# Patient Record
Sex: FEMALE | Race: WHITE
[De-identification: names, ages, dates, MRNs, and addresses within clinical notes are randomized per-mention and may not be internally consistent; named-entity substitution may affect disease eponyms.]

---

## 2018-02-26 ENCOUNTER — HOSPITAL ENCOUNTER (INPATIENT)
Dept: HOSPITAL 25 - ED | Age: 83
LOS: 7 days | Discharge: NURSING, REHAB OR LONG-TERM CARE | DRG: 884 | End: 2018-03-05
Attending: HOSPITALIST | Admitting: INTERNAL MEDICINE
Payer: MEDICARE

## 2018-02-26 DIAGNOSIS — R47.01: ICD-10-CM

## 2018-02-26 DIAGNOSIS — Z90.49: ICD-10-CM

## 2018-02-26 DIAGNOSIS — E53.8: ICD-10-CM

## 2018-02-26 DIAGNOSIS — F22: ICD-10-CM

## 2018-02-26 DIAGNOSIS — Z82.49: ICD-10-CM

## 2018-02-26 DIAGNOSIS — Z87.440: ICD-10-CM

## 2018-02-26 DIAGNOSIS — F03.90: Primary | ICD-10-CM

## 2018-02-26 DIAGNOSIS — Z87.891: ICD-10-CM

## 2018-02-26 DIAGNOSIS — Z85.3: ICD-10-CM

## 2018-02-26 DIAGNOSIS — F05: ICD-10-CM

## 2018-02-26 DIAGNOSIS — F32.9: ICD-10-CM

## 2018-02-26 DIAGNOSIS — R45.1: ICD-10-CM

## 2018-02-26 DIAGNOSIS — E05.90: ICD-10-CM

## 2018-02-26 LAB
BASOPHILS # BLD AUTO: 0 10^3/UL (ref 0–0.2)
BASOPHILS # BLD AUTO: 0.1 10^3/UL (ref 0–0.2)
EOSINOPHIL # BLD AUTO: 0.1 10^3/UL (ref 0–0.6)
EOSINOPHIL # BLD AUTO: 0.1 10^3/UL (ref 0–0.6)
HCT VFR BLD AUTO: 32 % (ref 35–47)
HCT VFR BLD AUTO: 32 % (ref 35–47)
HGB BLD-MCNC: 10.7 G/DL (ref 12–16)
HGB BLD-MCNC: 10.8 G/DL (ref 12–16)
LYMPHOCYTES # BLD AUTO: 3.1 10^3/UL (ref 1–4.8)
LYMPHOCYTES # BLD AUTO: 4 10^3/UL (ref 1–4.8)
MCH RBC QN AUTO: 32 PG (ref 27–31)
MCH RBC QN AUTO: 32 PG (ref 27–31)
MCHC RBC AUTO-ENTMCNC: 33 G/DL (ref 31–36)
MCHC RBC AUTO-ENTMCNC: 34 G/DL (ref 31–36)
MCV RBC AUTO: 96 FL (ref 80–97)
MCV RBC AUTO: 96 FL (ref 80–97)
MONOCYTES # BLD AUTO: 0.5 10^3/UL (ref 0–0.8)
MONOCYTES # BLD AUTO: 0.6 10^3/UL (ref 0–0.8)
NEUTROPHILS # BLD AUTO: 3.9 10^3/UL (ref 1.5–7.7)
NEUTROPHILS # BLD AUTO: 5 10^3/UL (ref 1.5–7.7)
NRBC # BLD AUTO: 0 10^3/UL
NRBC # BLD AUTO: 0 10^3/UL
NRBC BLD QL AUTO: 0
NRBC BLD QL AUTO: 0.1
PLATELET # BLD AUTO: 163 10^3/UL (ref 150–450)
PLATELET # BLD AUTO: 169 10^3/UL (ref 150–450)
RBC # BLD AUTO: 3.33 10^6/UL (ref 4–5.4)
RBC # BLD AUTO: 3.34 10^6/UL (ref 4–5.4)
WBC # BLD AUTO: 8.6 10^3/UL (ref 3.5–10.8)
WBC # BLD AUTO: 8.9 10^3/UL (ref 3.5–10.8)

## 2018-02-26 PROCEDURE — 36415 COLL VENOUS BLD VENIPUNCTURE: CPT

## 2018-02-26 PROCEDURE — 84520 ASSAY OF UREA NITROGEN: CPT

## 2018-02-26 PROCEDURE — 80053 COMPREHEN METABOLIC PANEL: CPT

## 2018-02-26 PROCEDURE — G0378 HOSPITAL OBSERVATION PER HR: HCPCS

## 2018-02-26 PROCEDURE — 94760 N-INVAS EAR/PLS OXIMETRY 1: CPT

## 2018-02-26 PROCEDURE — 70450 CT HEAD/BRAIN W/O DYE: CPT

## 2018-02-26 PROCEDURE — 93005 ELECTROCARDIOGRAM TRACING: CPT

## 2018-02-26 PROCEDURE — 84436 ASSAY OF TOTAL THYROXINE: CPT

## 2018-02-26 PROCEDURE — 84479 ASSAY OF THYROID (T3 OR T4): CPT

## 2018-02-26 PROCEDURE — 82607 VITAMIN B-12: CPT

## 2018-02-26 PROCEDURE — 85025 COMPLETE CBC W/AUTO DIFF WBC: CPT

## 2018-02-26 PROCEDURE — 84439 ASSAY OF FREE THYROXINE: CPT

## 2018-02-26 PROCEDURE — 83605 ASSAY OF LACTIC ACID: CPT

## 2018-02-26 PROCEDURE — 84481 FREE ASSAY (FT-3): CPT

## 2018-02-26 PROCEDURE — 99285 EMERGENCY DEPT VISIT HI MDM: CPT

## 2018-02-26 PROCEDURE — 84443 ASSAY THYROID STIM HORMONE: CPT

## 2018-02-26 PROCEDURE — 82306 VITAMIN D 25 HYDROXY: CPT

## 2018-02-26 PROCEDURE — 81003 URINALYSIS AUTO W/O SCOPE: CPT

## 2018-02-26 PROCEDURE — 82140 ASSAY OF AMMONIA: CPT

## 2018-02-26 PROCEDURE — 80048 BASIC METABOLIC PNL TOTAL CA: CPT

## 2018-02-26 PROCEDURE — 71045 X-RAY EXAM CHEST 1 VIEW: CPT

## 2018-02-26 PROCEDURE — 84484 ASSAY OF TROPONIN QUANT: CPT

## 2018-02-26 PROCEDURE — 70551 MRI BRAIN STEM W/O DYE: CPT

## 2018-02-26 RX ADMIN — HEPARIN SODIUM SCH UNITS: 5000 INJECTION INTRAVENOUS; SUBCUTANEOUS at 22:47

## 2018-02-26 NOTE — ED
Alberto KEYES Gabriel, scribed for Kali Michelle MD on 02/26/18 at 1445 .





GI/ HPI





- HPI Summary


HPI Summary: 





This patient is a 85 year old F BIBA to [facility] with a chief complaint of a 

possible UTI. Pt lives at home with her son and the visiting nurse reports that 

the patients urine was foul and dark. Pt seems to agitated and  disowned her 

son in the ED. She seems confused and goes from being somewhat cooperative to 

not cooperative when asked questions about her condition.  Pt asked the doctor 

to leave the room claiming he was not a real doctor and then became tearful. Hx 

of UTI and abx noncompliance. 


LEVEL 5 CAVEAT: HPI limited due to the patient being uncooperative. 





- History of Current Complaint


Time Seen by Provider: 02/26/18 14:38


Stated Complaint: POSSIBLE UTI


Hx Obtained From: Patient, Family/Caretaker, EMS, Medical Records


Hx From Patient Unobtainable Due To: Altered Mental Status


Onset/Duration: Still Present


Timing: Constant


Severity: Moderate


Current Severity: Moderate


Pain Intensity: 0


Associated Signs and Symptoms: Positive: Negative - fever





- Allergy/Home Medications


Allergies/Adverse Reactions: 


 Allergies











Allergy/AdvReac Type Severity Reaction Status Date / Time


 


No Known Allergies Allergy   Verified 02/26/18 15:26











Home Medications: 


 Home Medications





Cholecalciferol TAB* [Vitamin D TAB*] 1,000 unit PO DAILY 02/26/18 [History 

Confirmed 02/26/18]


Cyanocobalamin TAB* [Vitamin B12 TAB*] 500 mcg PO QAM 02/26/18 [History 

Confirmed 02/26/18]


Escitalopram (NF) [Lexapro 5 mg (NF)] 5 mg PO QAM 02/26/18 [History Confirmed 02 /26/18]


Levothyroxine TAB* [Synthroid TAB*] 100 mcg PO QAM 02/26/18 [History Confirmed 

02/26/18]


Mirtazapine TAB* [Remeron TAB*] 15 mg PO QPM 02/26/18 [History Confirmed 02/26/ 18]


Rivastigmine PATCH 4.6 MG(NF) [Exelon(NF)] 1 patch TRANSDERM DAILY 02/26/18 [

History Confirmed 02/26/18]


Sulfamethox/Trimethoprim DS* [Bactrim /160 TAB*] 1 tab PO BID 02/26/18 [

History Confirmed 02/26/18]











PMH/Surg Hx/FS Hx/Imm Hx


 History: Reports: Other  Problems/Disorders - UTI 


Infectious Disease History: No


Infectious Disease History: 


   Denies: Traveled Outside the [place] in Last 30 Days





- Social History


Lives: With Family





- Additional Comments


History Additional Comments: 





LEVEL 5 CAVEAT: PMHx limited due to the patient being uncooperative. 





Review of Systems





- ROS Summary


Review of Systems Summary: 





LEVEL 5 CAVEAT: ROS limited due to the patient being uncooperative. 


Negative: Fever


Negative: Slurred Speech


All Other Systems Reviewed And Are Negative: No





Physical Exam





- Summary


Physical Exam Summary: 





LEVEL 5 CAVEAT: PE limited due to the patient being uncooperative. Pt would not 

let any examination take place. 





Appearance: The patient is an elderly female.





Skin:  skin color reflects adequate perfusion.


 


HEENT:  The head is normocephalic and atraumatic.  The conjunctivae are clear 

and without drainage.  Nares are patent and without drainage.  Mouth reveals 

moist mucous membranes. The external ears are intact.





 


Neurological: Patient is alert and oriented.  The patient has symmetrical motor 

strength in all four extremities.  Cranial nerves are grossly intact. 


 


Psychiatric: The patient is confused and tearful 


 





Triage Information Reviewed: Yes


Vital Signs On Initial Exam: 


 Initial Vitals











Temp Pulse Resp BP Pulse Ox


 


 97.1 F   69   15   139/93   95 


 


 02/26/18 14:30  02/26/18 14:30  02/26/18 14:30  02/26/18 14:30  02/26/18 14:30











Vital Signs Reviewed: Yes


Completion Of Physical Exam Limited Due To: Level 5





Diagnostics





- Vital Signs


 Vital Signs











  Temp Pulse Resp BP Pulse Ox


 


 02/26/18 14:30  97.1 F  69  15  139/93  95














- Laboratory


Lab Results: 


 Lab Results











  02/26/18 02/26/18 02/26/18 Range/Units





  15:33 15:59 15:59 


 


WBC   8.9   (3.5-10.8)  10^3/ul


 


RBC   3.33 L   (4.0-5.4)  10^6/ul


 


Hgb   10.8 L   (12.0-16.0)  g/dl


 


Hct   32 L   (35-47)  %


 


MCV   96   (80-97)  fL


 


MCH   32 H   (27-31)  pg


 


MCHC   34   (31-36)  g/dl


 


RDW   16 H   (10.5-15)  %


 


Plt Count   163   (150-450)  10^3/ul


 


MPV   8   (7.4-10.4)  um3


 


Neut % (Auto)   56.1   (38-83)  %


 


Lymph % (Auto)   35.0   (25-47)  %


 


Mono % (Auto)   7.1 H   (0-7)  %


 


Eos % (Auto)   1.3   (0-6)  %


 


Baso % (Auto)   0.5   (0-2)  %


 


Absolute Neuts (auto)   5.0   (1.5-7.7)  10^3/ul


 


Absolute Lymphs (auto)   3.1   (1.0-4.8)  10^3/ul


 


Absolute Monos (auto)   0.6   (0-0.8)  10^3/ul


 


Absolute Eos (auto)   0.1   (0-0.6)  10^3/ul


 


Absolute Basos (auto)   0   (0-0.2)  10^3/ul


 


Absolute Nucleated RBC   0   10^3/ul


 


Nucleated RBC %   0.1   


 


Sodium    141  (133-145)  mmol/L


 


Potassium    4.3  (3.5-5.0)  mmol/L


 


Chloride    109  (101-111)  mmol/L


 


Carbon Dioxide    28  (22-32)  mmol/L


 


Anion Gap    4  (2-11)  mmol/L


 


BUN    17  (6-24)  mg/dL


 


Creatinine    0.90  (0.51-0.95)  mg/dL


 


Est GFR ( Amer)    76.5  (>60)  


 


Est GFR (Non-Af Amer)    59.5  (>60)  


 


BUN/Creatinine Ratio    18.9  (8-20)  


 


Glucose    115 H  ()  mg/dL


 


Lactic Acid     (0.5-2.0)  mmol/L


 


Calcium    9.2  (8.6-10.3)  mg/dL


 


Total Bilirubin    0.30  (0.2-1.0)  mg/dL


 


AST    15  (13-39)  U/L


 


ALT    5 L  (7-52)  U/L


 


Alkaline Phosphatase    43  ()  U/L


 


Ammonia     (16-53)  mol/L


 


Troponin I    0.00  (<0.04)  ng/mL


 


Total Protein    6.6  (6.4-8.9)  g/dL


 


Albumin    3.4  (3.2-5.2)  g/dL


 


Globulin    3.2  (2-4)  g/dL


 


Albumin/Globulin Ratio    1.1  (1-3)  


 


Vitamin B12    457  (180-914)  pg/mL


 


TSH    0.03 L  (0.34-5.60)  mcIU/mL


 


Free T4    1.41 H  (0.61-1.12)  ng/dL


 


Thyroxine (T4)    9.68  (6.09-12.23)  mcg/mL


 


Free T3    2.80  (2.5-3.9)  pg/mL


 


Total T3    0.71 L  (0.87-1.78)  ng/mL


 


Urine Color  Yellow    


 


Urine Appearance  Cloudy    


 


Urine pH  7.0    (5-9)  


 


Ur Specific Gravity  1.006 L    (1.010-1.030)  


 


Urine Protein  Negative    (Negative)  


 


Urine Ketones  Negative    (Negative)  


 


Urine Blood  Negative    (Negative)  


 


Urine Nitrate  Negative    (Negative)  


 


Urine Bilirubin  Negative    (Negative)  


 


Urine Urobilinogen  Negative    (Negative)  


 


Ur Leukocyte Esterase  Negative    (Negative)  


 


Urine Glucose  Negative    (Negative)  














  02/26/18 02/26/18 02/26/18 Range/Units





  15:59 19:37 19:37 


 


WBC    8.6  (3.5-10.8)  10^3/ul


 


RBC    3.34 L  (4.0-5.4)  10^6/ul


 


Hgb    10.7 L  (12.0-16.0)  g/dl


 


Hct    32 L  (35-47)  %


 


MCV    96  (80-97)  fL


 


MCH    32 H  (27-31)  pg


 


MCHC    33  (31-36)  g/dl


 


RDW    15  (10.5-15)  %


 


Plt Count    169  (150-450)  10^3/ul


 


MPV    8  (7.4-10.4)  um3


 


Neut % (Auto)    45.7  (38-83)  %


 


Lymph % (Auto)    46.3  (25-47)  %


 


Mono % (Auto)    6.1  (0-7)  %


 


Eos % (Auto)    1.2  (0-6)  %


 


Baso % (Auto)    0.7  (0-2)  %


 


Absolute Neuts (auto)    3.9  (1.5-7.7)  10^3/ul


 


Absolute Lymphs (auto)    4.0  (1.0-4.8)  10^3/ul


 


Absolute Monos (auto)    0.5  (0-0.8)  10^3/ul


 


Absolute Eos (auto)    0.1  (0-0.6)  10^3/ul


 


Absolute Basos (auto)    0.1  (0-0.2)  10^3/ul


 


Absolute Nucleated RBC    0  10^3/ul


 


Nucleated RBC %    0  


 


Sodium     (133-145)  mmol/L


 


Potassium     (3.5-5.0)  mmol/L


 


Chloride     (101-111)  mmol/L


 


Carbon Dioxide     (22-32)  mmol/L


 


Anion Gap     (2-11)  mmol/L


 


BUN   17   (6-24)  mg/dL


 


Creatinine     (0.51-0.95)  mg/dL


 


Est GFR ( Amer)     (>60)  


 


Est GFR (Non-Af Amer)     (>60)  


 


BUN/Creatinine Ratio     (8-20)  


 


Glucose     ()  mg/dL


 


Lactic Acid  1.7    (0.5-2.0)  mmol/L


 


Calcium     (8.6-10.3)  mg/dL


 


Total Bilirubin     (0.2-1.0)  mg/dL


 


AST     (13-39)  U/L


 


ALT     (7-52)  U/L


 


Alkaline Phosphatase     ()  U/L


 


Ammonia     (16-53)  mol/L


 


Troponin I     (<0.04)  ng/mL


 


Total Protein     (6.4-8.9)  g/dL


 


Albumin     (3.2-5.2)  g/dL


 


Globulin     (2-4)  g/dL


 


Albumin/Globulin Ratio     (1-3)  


 


Vitamin B12     (180-914)  pg/mL


 


TSH     (0.34-5.60)  mcIU/mL


 


Free T4     (0.61-1.12)  ng/dL


 


Thyroxine (T4)     (6.09-12.23)  mcg/mL


 


Free T3     (2.5-3.9)  pg/mL


 


Total T3     (0.87-1.78)  ng/mL


 


Urine Color     


 


Urine Appearance     


 


Urine pH     (5-9)  


 


Ur Specific Gravity     (1.010-1.030)  


 


Urine Protein     (Negative)  


 


Urine Ketones     (Negative)  


 


Urine Blood     (Negative)  


 


Urine Nitrate     (Negative)  


 


Urine Bilirubin     (Negative)  


 


Urine Urobilinogen     (Negative)  


 


Ur Leukocyte Esterase     (Negative)  


 


Urine Glucose     (Negative)  














  02/26/18 Range/Units





  19:37 


 


WBC   (3.5-10.8)  10^3/ul


 


RBC   (4.0-5.4)  10^6/ul


 


Hgb   (12.0-16.0)  g/dl


 


Hct   (35-47)  %


 


MCV   (80-97)  fL


 


MCH   (27-31)  pg


 


MCHC   (31-36)  g/dl


 


RDW   (10.5-15)  %


 


Plt Count   (150-450)  10^3/ul


 


MPV   (7.4-10.4)  um3


 


Neut % (Auto)   (38-83)  %


 


Lymph % (Auto)   (25-47)  %


 


Mono % (Auto)   (0-7)  %


 


Eos % (Auto)   (0-6)  %


 


Baso % (Auto)   (0-2)  %


 


Absolute Neuts (auto)   (1.5-7.7)  10^3/ul


 


Absolute Lymphs (auto)   (1.0-4.8)  10^3/ul


 


Absolute Monos (auto)   (0-0.8)  10^3/ul


 


Absolute Eos (auto)   (0-0.6)  10^3/ul


 


Absolute Basos (auto)   (0-0.2)  10^3/ul


 


Absolute Nucleated RBC   10^3/ul


 


Nucleated RBC %   


 


Sodium   (133-145)  mmol/L


 


Potassium   (3.5-5.0)  mmol/L


 


Chloride   (101-111)  mmol/L


 


Carbon Dioxide   (22-32)  mmol/L


 


Anion Gap   (2-11)  mmol/L


 


BUN   (6-24)  mg/dL


 


Creatinine   (0.51-0.95)  mg/dL


 


Est GFR ( Amer)   (>60)  


 


Est GFR (Non-Af Amer)   (>60)  


 


BUN/Creatinine Ratio   (8-20)  


 


Glucose   ()  mg/dL


 


Lactic Acid   (0.5-2.0)  mmol/L


 


Calcium   (8.6-10.3)  mg/dL


 


Total Bilirubin   (0.2-1.0)  mg/dL


 


AST   (13-39)  U/L


 


ALT   (7-52)  U/L


 


Alkaline Phosphatase   ()  U/L


 


Ammonia  38  (16-53)  mol/L


 


Troponin I   (<0.04)  ng/mL


 


Total Protein   (6.4-8.9)  g/dL


 


Albumin   (3.2-5.2)  g/dL


 


Globulin   (2-4)  g/dL


 


Albumin/Globulin Ratio   (1-3)  


 


Vitamin B12   (180-914)  pg/mL


 


TSH   (0.34-5.60)  mcIU/mL


 


Free T4   (0.61-1.12)  ng/dL


 


Thyroxine (T4)   (6.09-12.23)  mcg/mL


 


Free T3   (2.5-3.9)  pg/mL


 


Total T3   (0.87-1.78)  ng/mL


 


Urine Color   


 


Urine Appearance   


 


Urine pH   (5-9)  


 


Ur Specific Gravity   (1.010-1.030)  


 


Urine Protein   (Negative)  


 


Urine Ketones   (Negative)  


 


Urine Blood   (Negative)  


 


Urine Nitrate   (Negative)  


 


Urine Bilirubin   (Negative)  


 


Urine Urobilinogen   (Negative)  


 


Ur Leukocyte Esterase   (Negative)  


 


Urine Glucose   (Negative)  











Result Diagrams: 


 02/26/18 19:37





 02/26/18 19:37


Lab Statement: Any lab studies that have been ordered have been reviewed, and 

results considered in the medical decision making process.





- CT


  ** CT Head


CT Interpretation Completed By: Radiologist - 1. No acute intracranial process 

evident. 2. Involutional change and stigmata of chronic small vessel ischemic 

disease. ED physician has reviewed this radiology report.





- EKG


  ** 1723


Cardiac Rate: NL


EKG Rhythm: Sinus Rhythm - at 74 BPM


EKG Interpretation: baseline wander in inferior leads 





GIGU Course/Dx





- Course


Course Of Treatment: Ms. Stokes presents with confusion and agitation.   

According to the son this is new and, therefore, represents an acute delirium.  

I could not find the source and have asked the hospitalist service to evaluate 

her.





- Diagnoses


Provider Diagnoses: 


 Acute delirium








- Physician Notifications


Discussed Care Of Patient With: Fred Frankenberg


Time Discussed With Above Provider: 20:27


Instructed by Provider To: Admit As Inpatient





Discharge





- Discharge Plan


Condition: Fair


Disposition: ADMITTED TO [facility]





The documentation as recorded by the Alberto aviles Gabriel accurately reflects 

the service I personally performed and the decisions made by me, Kali Michelle MD.

## 2018-02-26 NOTE — RAD
Indication: Altered mental status.



Comparison: No prior exams available on the [facility] PACS for comparison.



Technique: Noncontrast CT vertex of skull through foramen magnum.



Report: Moderately severe prominence of the cerebral sulci and moderate prominence of the

cerebellar fissures reflecting atrophy. Unremarkable ventricles and basal cisterns.

Decreased density in the periventricular and subcortical white matter while non-specific

is most likely due to chronic microangiopathy. Negative for gray matter white matter

obscuration, intra or extra-axial hemorrhage, or mass effect. Unremarkable partially

visualized orbital contents.



No fracture or suspicious calvarial or skull base lesion evident. Indolent thickening of

the inner table of the frontal bone noted. Clear visualized paranasal sinuses and mastoid

air spaces. Unremarkable scalp.



IMPRESSION: 

1. No acute intracranial process evident.

2. Involutional change and stigmata of chronic small vessel ischemic disease.

## 2018-02-26 NOTE — HP
CC:  Dr. Sims; Dr. Antonio *

 

HISTORY AND PHYSICAL:

 

DATE OF ADMISSION:  18

 

PRIMARY CARE PROVIDER:  Dr. Sims.

 

ATTENDING PHYSICIAN WHILE IN THE HOSPITAL:  Fred Frankenberg, MD * (report 
dictated by Donald Leong NP).

 

CHIEF COMPLAINT:  Altered mental status.

 

HISTORY OF PRESENT ILLNESS:  I would like to preface the report by saying that 
the patient has a moderate amount of underlying dementia.  She really does not 
know why she is here today.  She does not realize that she, according to the son
, had been having hallucinations and was paranoid and not acting at her 
baseline.  The family says that over the last couple of days, there has been 
issues with the patient being aggressive towards her son particularly at night.
  They do note that she gets more confused at night.  There has been no reports 
of fevers or chills.  There was concern for possible UTI so that is why they 
came into the hospital.  She was evaluated.  When I am asking her if she is 
having any complaints, she denies chest pain, denies having any cough, fevers, 
no vomiting or diarrhea.  Family has not noticed any of these symptoms.  They 
do note that she has been cheeking some of her pills at times.  She has not 
been swallowing her hypothyroid medications and has not taking her B12 
medication.  There was concern though because she was a little more confused 
than her baseline, she was more paranoid, and the family brought her into the 
hospital today to be evaluated.

 

PAST MEDICAL HISTORY:  Significant for:

1.  Hypothyroidism.

2.  Dementia.

3.  Breast cancer.

 

PAST SURGICAL HISTORY:  She had a laparoscopic cholecystectomy.

 

HOME MEDICATIONS:  Include:

1.  Bactrim 1 tablet p.o. b.i.d.

2.  B12 500 mcg daily.

3.  Vitamin D 1000 units daily.

4.  Exelon 1 patch transdermally daily.

5.  Remeron 15 mg p.o. q.p.m.

6.  Synthroid 100 mcg p.o. daily.

7.  Lexapro 5 mg p.o. daily.

 

ALLERGIES TO MEDICATIONS:  Include no known drug allergies.

 

FAMILY HISTORY:  Her father  of MI.  She thinks her mother is still alive 
in [place], but is not the case.

 

SOCIAL HISTORY:  She is a former smoker.  She lives with her son, Linda now.

 

REVIEW OF SYSTEMS:  There is no documented fever.  Denied having any 
significant weight change.  There was no double vision.  There is no ear 
discharge.  Denied having any rhinorrhea.  There is no sore throat.  No thyroid 
enlargement.  Denied having any chest pain.  There is orthopnea.  There is no 
nocturnal dyspnea.  There was no abdominal pain.  There is no nausea, no 
vomiting, no dysuria, no frequency, no seizure, no loss of consciousness, no 
pruritus, and no skin ulcerations.  Review of 14 systems completed, all others 
negative.

 

                               PHYSICAL EXAMINATION

 

GENERAL:  Ms. Stokes is an 85-year-old female patient.  She is sitting in the ED 
stretcher.  She does not appear to be in any acute distress.

 

VITAL SIGNS:  Blood pressure 153/75, pulse 67, respirations 16, O2 saturation 95
%, temperature 97.1.

 

HEENT:  Head, atraumatic, normocephalic.  Eyes:  EOMs intact.  Sclerae 
anicteric and not pale.  Throat:  Oral mucosa appears to be moist.  No 
oropharyngeal erythema.

 

NECK:  Supple.

 

LUNGS:  Clear to auscultation bilaterally.  No wheezes, rales or rhonchi.

 

HEART:  Sounds S1, S2.  Regular rate and rhythm.  No murmurs, rubs or gallops.

 

ABDOMEN:  Soft, flat, nontender.  Bowel sounds present.

 

EXTREMITIES:  Pulses were 2+ throughout.  She had no peripheral edema noted.  
She is moving all 4 extremities with 5/5 strength.

 

NEUROLOGIC:  She is awake to herself only.  She is confused to time and place. 
Speech is clear.  Tongue is midline.  No gross focal deficits.

 

SKIN:  Grossly intact.

 

 DIAGNOSTIC STUDIES/ LABORATORY DATA:  WBC 8.6, RBC of 3.34, hemoglobin 10.7, 
hematocrit 32, platelet count 169.  Sodium 141, potassium 4.3, chloride of 109, 
bicarbonate 28, BUN 17, creatinine 0.90.  Glucose 115, lactic 1.7, calcium 9.2, 
total bilirubin 0.3.  AST 15, ALT 5, alk phos 43, troponin 0. Albumin of 3.4. 
Urine was obtained and appeared to be negative.  She had a CT of the brain, 
showed no acute intracranial process evident, involutional change, stigmata of 
chronic small vessel ischemic change.  She had an EKG obtained today, which 
showed normal sinus rhythm, rate of 74.  No ST elevations or T wave inversions.
  No previous EKG for comparison.  Old medical records were reviewed.

 

ASSESSMENT AND PLAN:  Ms. Stokes is an 85-year-old female patient coming into ED 
today with complaints of altered mental status, worsening confusion.  We were 
asked to evaluate for admission.  She will be admitted under observation status 
for:

 

1.  Dementia with acute delirium.  At this point, I do not see any 
precipitating factor.  I do note her TSH is low.  I have held her Synthroid at 
this point.  We will check a free T3, free T4.  I am going to get Neurology 
involved.  I am looking for any other precipitating factors.  I am checking 
ammonia level.  I am also checking her B12 level.  We will get neuro checks 
every 4 hours and we will continue to follow her, but this could be progression 
of her disease.  We will continue her meds as prescribed and we will continue 
to monitor.

2.  Hypothyroidism.  Again, her TSH is low.  I am holding her Synthroid.  In 
dementia patient, this could be a culprit as to why she has been a little more 
paranoid and having hallucinations.  We will monitor.

3.  History of breast cancer.  Follow with her primary.

4.  DVT prophylaxis.  She will be placed on heparin subcu.

5.  Code status.  Full code.  Currently, the son will be in discussion with 
other children to discuss the code status.  The son would like to speak to his 
sisters.

6.  Fluid, electrolytes, and nutrition.  She can have a regular diet.

 

TIME SPENT:  Time spent on admission was 60 minutes, greater than half the time 
was spent face-to-face with the patient obtaining my history and physical, 
other half of the time spent going over the plan of care with the patient and 
implementing plan of care.

 

I did discuss plan of care with my attending, Dr. Frankenberg, he is in 
agreement.

 

 ____________________________________ DONALD LEONG, BENI

 

[id number] #: [id number]

SOREN

## 2018-02-27 LAB
BASOPHILS # BLD AUTO: 0 10^3/UL (ref 0–0.2)
EOSINOPHIL # BLD AUTO: 0.1 10^3/UL (ref 0–0.6)
HCT VFR BLD AUTO: 30 % (ref 35–47)
HGB BLD-MCNC: 10.4 G/DL (ref 12–16)
LYMPHOCYTES # BLD AUTO: 4.4 10^3/UL (ref 1–4.8)
MCH RBC QN AUTO: 33 PG (ref 27–31)
MCHC RBC AUTO-ENTMCNC: 34 G/DL (ref 31–36)
MCV RBC AUTO: 97 FL (ref 80–97)
MONOCYTES # BLD AUTO: 0.5 10^3/UL (ref 0–0.8)
NEUTROPHILS # BLD AUTO: 2.9 10^3/UL (ref 1.5–7.7)
NRBC # BLD AUTO: 0 10^3/UL
NRBC BLD QL AUTO: 0.1
PLATELET # BLD AUTO: 150 10^3/UL (ref 150–450)
RBC # BLD AUTO: 3.14 10^6/UL (ref 4–5.4)
WBC # BLD AUTO: 8 10^3/UL (ref 3.5–10.8)

## 2018-02-27 RX ADMIN — OLANZAPINE SCH MG: 2.5 TABLET, FILM COATED ORAL at 16:32

## 2018-02-27 RX ADMIN — OLANZAPINE SCH MG: 2.5 TABLET, FILM COATED ORAL at 21:37

## 2018-02-27 RX ADMIN — HEPARIN SODIUM SCH UNITS: 5000 INJECTION INTRAVENOUS; SUBCUTANEOUS at 05:38

## 2018-02-27 RX ADMIN — CYANOCOBALAMIN TAB 500 MCG SCH MCG: 500 TAB at 08:40

## 2018-02-27 RX ADMIN — HEPARIN SODIUM SCH UNITS: 5000 INJECTION INTRAVENOUS; SUBCUTANEOUS at 14:43

## 2018-02-27 RX ADMIN — HEPARIN SODIUM SCH UNITS: 5000 INJECTION INTRAVENOUS; SUBCUTANEOUS at 21:37

## 2018-02-27 RX ADMIN — OLANZAPINE SCH MG: 2.5 TABLET, FILM COATED ORAL at 10:05

## 2018-02-27 RX ADMIN — CITALOPRAM HYDROBROMIDE SCH MG: 10 TABLET ORAL at 08:40

## 2018-02-27 NOTE — CONS
NEUROLOGY CONSULTATION REPORT:

 

DATE OF CONSULT:  18

 

REQUESTING PROVIDER:  Donald Leong NP

 

REASON FOR CONSULT:  Dementia.

 

HISTORY OF PRESENT ILLNESS:  Brittanie Stokes is an 85-year-old woman with a history 
of memory difficulties over the past few years as well as hypothyroidism and 
past history of breast cancer who was brought to the hospital last night by her 
son when she had become more irritable and paranoid at home.  Over the last 
couple of days, she has been accusing him of being "the bad Linda" and has 
been confrontational with his wife.  She has been accusing them of kidnapping 
her and has had other paranoid ideas about them.  When she has behaved this way 
in the past, she has had urinary tract infection and so they came to the 
hospital for that evaluation.  They have also been looking into having her 
placed in a memory care unit and she was admitted to the hospital because of 
this change in her behavior.

 

Her urine was noted to show no signs for infection, but her TSH was suppressed 
and her free T4 elevated and so her levothyroxine has been held.  After my 
evaluation of the patient this afternoon, I spoke with the son to get an idea 
of her baseline and in particular her baseline speech pattern.  I noticed on my 
evaluation that she is rambling and the thoughts that she is stringing together 
do not make a lot of sense though her speech is fluent.  Her son noticed the 
same thing today and says that this is worse than he has ever seen her before 
in the past.  I was not able to have a coherent conversation with her and spoke 
also with the daughter-in-law, who said that the last time they were able to 
have a conversation with her was about a week ago.  At that time, she was able 
to have somewhat meaningful conversation about aging apparently.  Neurology 
consultation was requested in the evaluation of dementia and these recent 
mental status changes.

 

PAST MEDICAL HISTORY:

1.  Hypothyroidism.

2.  Dementia.

3.  Recurrent UTIs.

4.  Breast cancer.

 

PAST SURGICAL HISTORY:  Laparoscopic cholecystectomy.

 

HOME MEDICATIONS:

1.  Bactrim is listed on her home medications, but her son reports that she 
finished that around .

2.  B12 500 mcg daily.

3.  Vitamin D 1000 units daily.

4.  Exelon patch daily.

5.  Remeron 15 mg at bedtime.

6.  Synthroid 100 mcg daily.

7.  Lexapro 5 mg daily.

 

ALLERGIES:  No known drug allergies.

 

FAMILY HISTORY:  Her mother  in a car accident many years ago.  Her father 
 of heart attack.  There is no known history of dementia in the family.

 

SOCIAL HISTORY:  She is a former smoker.  She currently lives with her son, 
Linda, and moved in January.  Prior to that, he says that she lived for "a 
little while" with his sister and before that was living in her own home with 
another one of his sisters.

 

REVIEW OF SYSTEMS:  She otherwise denies any vision changes, chest pain, 
abdominal pain, weakness, numbness or tingling in her extremities.  She has had 
no balance difficulties and no falls at home.

 

PHYSICAL EXAM:  Vital Signs:  Temperature  98.9, blood pressure 106/62, heart 
rate 75, oxygen saturation 98% on room air.  On general examination, she is 
awake and alert.  She is seated in the chair next to her bed.  She has her 
lunch tray next to her and ate her pie, but did not eat much of anything else.  
Her heart is in a regular rate and rhythm.  Lungs:  Clear to auscultation 
bilaterally.  There were no carotid bruits auscultated.  She has no lower 
extremity edema and her skin is intact.  She does have a band-aid on one of the 
fingers of her left hand.

 

On neurologic examination, she stated that she was 89 years old.  She was not 
able to name the city, but did know that she was in the state of [place].  She 
was not able to name the type of facility she was in even when presented with a 
list of choices.  She appeared to have a fluent aphasia.  She was able to 
describe the cookie theft picture though commented that the girl in the picture 
had broken her neck.  She was able to name most objects on the stroke cards, 
but was not able to name a cactus and was somewhat able to describe the glove, 
but was not able to come up with the word.  She was able to name hammock 
without difficulty, however.  She made just a few errors when reading the 
phrases on the stroke card and there is no dysarthria.  When asked to spell the 
word "world" backwards, she stated that she had to have something on her palate 
in order to be able to do so.  When asked to perform serial 7s, she motioned as 
though she wanted to lie back and said that she needed to be reclined in order 
to do that and began rambling about something off topic.  She was not able to 
ultimately perform serial 7s.  On cranial nerve exam, pupils were equal, round, 
and reactive from 3 to 2 mm bilaterally.  She was able to follow the examiner's 
face in all directions with some limitation of up gaze. Fields were full to 
confrontation with no extinction to double simultaneous stimulation.  Facial 
sensation and musculature is full and symmetric.  Hearing is intact to voice.  
The palate elevates symmetrically and the tongue is midline.  She has some 
difficulty cooperating with formal strength testing, but there is no clear 
focal weakness in all extremities or at least the antigravity.  She was not 
able to supinate the left arm as much as the right, but there was no clear 
pronator drift. Sensation was intact to light touch and pinprick in the upper 
and lower extremities.  Reflexes were 2+ in the upper extremities and at the 
knees with absent ankle jerks and mute toes.  Finger-to-nose is intact without 
ataxia.  She ambulates with a narrow based gait, which is steady. Glabellar, 
palmomental and grasp reflexes absent.

 

DIAGNOSTIC STUDIES/LAB DATA:  Her CMP was overall unremarkable aside from a 
nonfasting glucose of 115.  Her TSH was low at 0.03 and free T4 elevated at 
1.41. Total T3 was 0.71.  Her CBC shows hematocrit of 30, down from 32 
yesterday and a stable hemoglobin of 10.4 with normal platelet count and white 
blood cell count. Her MCH is elevated at 33 and RDW of 16.  Urinalysis was 
negative for infection.

 

Her brain CT was personally reviewed and shows diffuse atrophy, but perhaps 
more prominent in the frontal and temporal regions.  In addition, there 
appeared to be perhaps some old areas of infarction in the right insular region 
and also evidence of chronic small vessel disease.

 

IMPRESSION:  Brittanie Stokes is an 85-year-old woman with dementia who came in with 
increasing paranoia and somewhat aggressive behavior at home.  She does not 
have any infectious etiology that has been elucidated at this point though her 
TSH is suppressed perhaps suggesting that she is overmedicated with her 
levothyroxine and as a result this is being held currently.  A relative state 
of hyperthyroidism could contribute to a delirium on top of a dementia.  However
, I also note that she seems to have a prominent fluent aphasia, which can 
happen in dementia, but it is little unusual, so I would like to evaluation 
further with MRI of the brain if she is able to cooperate.  I do not want  her 
medicated for the MRI scan, but discussed with her what to expect and she 
indicates that she is not claustrophobic and hopefully she will be able to 
tolerate the exam.  If the MRI scan is negative for any acute change such as a 
small stroke, which might be causing a fluent aphasia, I think this is most 
likely related to her dementia and we will proceed with giving her supportive 
care as such.

 

Thank you for this consultation.

 

 [id number] #: [id number]

SOREN

## 2018-02-28 RX ADMIN — OLANZAPINE SCH MG: 2.5 TABLET, FILM COATED ORAL at 09:18

## 2018-02-28 RX ADMIN — HEPARIN SODIUM SCH UNITS: 5000 INJECTION INTRAVENOUS; SUBCUTANEOUS at 14:21

## 2018-02-28 RX ADMIN — OLANZAPINE SCH MG: 2.5 TABLET, FILM COATED ORAL at 03:50

## 2018-02-28 RX ADMIN — CITALOPRAM HYDROBROMIDE SCH MG: 10 TABLET ORAL at 09:19

## 2018-02-28 RX ADMIN — MIRTAZAPINE SCH MG: 15 TABLET, FILM COATED ORAL at 23:52

## 2018-02-28 RX ADMIN — HEPARIN SODIUM SCH UNITS: 5000 INJECTION INTRAVENOUS; SUBCUTANEOUS at 05:31

## 2018-02-28 RX ADMIN — HEPARIN SODIUM SCH UNITS: 5000 INJECTION INTRAVENOUS; SUBCUTANEOUS at 23:53

## 2018-02-28 RX ADMIN — HALOPERIDOL LACTATE PRN MG: 5 INJECTION, SOLUTION INTRAMUSCULAR at 23:59

## 2018-02-28 RX ADMIN — CYANOCOBALAMIN TAB 500 MCG SCH MCG: 500 TAB at 09:19

## 2018-02-28 RX ADMIN — HALOPERIDOL LACTATE PRN MG: 5 INJECTION, SOLUTION INTRAMUSCULAR at 16:32

## 2018-02-28 NOTE — PN
Subjective


Date of Service: 18


Interval History: 





Patient seen and examined. Very manic and talkative. Son and Dr. Antonio at 

bedside. Per RN, patient did not sleep last night and zyprexa PRN not working. 

Does not appear distressed, no pain, no complaints. Remains acutely confused.





Objective


Active Medications: 








Acetaminophen (Tylenol Tab*)  650 mg PO Q4H PRN


   PRN Reason: FEVER/PAIN


Citalopram Hydrobromide (Celexa Tab*)  10 mg PO QAM [facility]


   PRN Reason: Protocol


   Last Admin: 18 09:19 Dose:  10 mg


Cyanocobalamin (Vitamin B12 Tab*)  500 mcg PO QAM [facility]


   Last Admin: 18 09:19 Dose:  500 mcg


Heparin Sodium (Porcine) (Heparin Vial(*))  5,000 units SUBCUT Q8HR [facility]


   Last Admin: 18 05:31 Dose:  5,000 units


Mirtazapine (Remeron Tab*)  30 mg PO BEDTIME [facility]


Quetiapine Fumarate (Seroquel Tab*)  25 mg PO BEDTIME PRN


   PRN Reason: AGITATION








 Vital Signs - 8 hr











  18





  03:19 07:39 08:00


 


Temperature 97.3 F 98.1 F 


 


Pulse Rate 77 95 


 


Respiratory 18 18 18





Rate   


 


Blood Pressure 143/70 107/83 





(mmHg)   


 


O2 Sat by Pulse 96 93 





Oximetry   











Oxygen Devices in Use Now: None


Appearance: Alert, talkative, confused


Eyes: PERRLA


Ears/Nose/Mouth/Throat: NL Teeth, Lips, Gums


Neck: Trachea Midline


Respiratory: Symmetrical Chest Expansion and Respiratory Effort, Clear to 

Auscultation


Cardiovascular: NL Sounds; No Murmurs; No JVD


Abdominal: NL Sounds; No Tenderness; No Distention


Extremities: No Edema


Neurological: NL Gait, NL Muscle Strength and Tone


Nutrition: Taking PO's


Result Diagrams: 


 18 05:48





 18 05:48


Additional Lab and Data: 


 Lab Results











  18 Range/Units





  15:33 15:59 15:59 


 


WBC   8.9   (3.5-10.8)  10^3/ul


 


RBC   3.33 L   (4.0-5.4)  10^6/ul


 


Hgb   10.8 L   (12.0-16.0)  g/dl


 


Hct   32 L   (35-47)  %


 


MCV   96   (80-97)  fL


 


MCH   32 H   (27-31)  pg


 


MCHC   34   (31-36)  g/dl


 


RDW   16 H   (10.5-15)  %


 


Plt Count   163   (150-450)  10^3/ul


 


MPV   8   (7.4-10.4)  um3


 


Neut % (Auto)   56.1   (38-83)  %


 


Lymph % (Auto)   35.0   (25-47)  %


 


Mono % (Auto)   7.1 H   (0-7)  %


 


Eos % (Auto)   1.3   (0-6)  %


 


Baso % (Auto)   0.5   (0-2)  %


 


Absolute Neuts (auto)   5.0   (1.5-7.7)  10^3/ul


 


Absolute Lymphs (auto)   3.1   (1.0-4.8)  10^3/ul


 


Absolute Monos (auto)   0.6   (0-0.8)  10^3/ul


 


Absolute Eos (auto)   0.1   (0-0.6)  10^3/ul


 


Absolute Basos (auto)   0   (0-0.2)  10^3/ul


 


Absolute Nucleated RBC   0   10^3/ul


 


Nucleated RBC %   0.1   


 


Sodium    141  (133-145)  mmol/L


 


Potassium    4.3  (3.5-5.0)  mmol/L


 


Chloride    109  (101-111)  mmol/L


 


Carbon Dioxide    28  (22-32)  mmol/L


 


Anion Gap    4  (2-11)  mmol/L


 


BUN    17  (6-24)  mg/dL


 


Creatinine    0.90  (0.51-0.95)  mg/dL


 


Est GFR ( Amer)    76.5  (>60)  


 


Est GFR (Non-Af Amer)    59.5  (>60)  


 


BUN/Creatinine Ratio    18.9  (8-20)  


 


Glucose    115 H  ()  mg/dL


 


Lactic Acid     (0.5-2.0)  mmol/L


 


Calcium    9.2  (8.6-10.3)  mg/dL


 


Total Bilirubin    0.30  (0.2-1.0)  mg/dL


 


AST    15  (13-39)  U/L


 


ALT    5 L  (7-52)  U/L


 


Alkaline Phosphatase    43  ()  U/L


 


Ammonia     (16-53)  mol/L


 


Troponin I    0.00  (<0.04)  ng/mL


 


Total Protein    6.6  (6.4-8.9)  g/dL


 


Albumin    3.4  (3.2-5.2)  g/dL


 


Globulin    3.2  (2-4)  g/dL


 


Albumin/Globulin Ratio    1.1  (1-3)  


 


Vitamin B12    457  (180-914)  pg/mL


 


TSH    0.03 L  (0.34-5.60)  mcIU/mL


 


Free T4    1.41 H  (0.61-1.12)  ng/dL


 


Thyroxine (T4)    9.68  (6.09-12.23)  mcg/mL


 


Free T3    2.80  (2.5-3.9)  pg/mL


 


Total T3    0.71 L  (0.87-1.78)  ng/mL


 


Urine Color  Yellow    


 


Urine Appearance  Cloudy    


 


Urine pH  7.0    (5-9)  


 


Ur Specific Gravity  1.006 L    (1.010-1.030)  


 


Urine Protein  Negative    (Negative)  


 


Urine Ketones  Negative    (Negative)  


 


Urine Blood  Negative    (Negative)  


 


Urine Nitrate  Negative    (Negative)  


 


Urine Bilirubin  Negative    (Negative)  


 


Urine Urobilinogen  Negative    (Negative)  


 


Ur Leukocyte Esterase  Negative    (Negative)  


 


Urine Glucose  Negative    (Negative)  














  18 Range/Units





  15:59 19:37 19:37 


 


WBC    8.6  (3.5-10.8)  10^3/ul


 


RBC    3.34 L  (4.0-5.4)  10^6/ul


 


Hgb    10.7 L  (12.0-16.0)  g/dl


 


Hct    32 L  (35-47)  %


 


MCV    96  (80-97)  fL


 


MCH    32 H  (27-31)  pg


 


MCHC    33  (31-36)  g/dl


 


RDW    15  (10.5-15)  %


 


Plt Count    169  (150-450)  10^3/ul


 


MPV    8  (7.4-10.4)  um3


 


Neut % (Auto)    45.7  (38-83)  %


 


Lymph % (Auto)    46.3  (25-47)  %


 


Mono % (Auto)    6.1  (0-7)  %


 


Eos % (Auto)    1.2  (0-6)  %


 


Baso % (Auto)    0.7  (0-2)  %


 


Absolute Neuts (auto)    3.9  (1.5-7.7)  10^3/ul


 


Absolute Lymphs (auto)    4.0  (1.0-4.8)  10^3/ul


 


Absolute Monos (auto)    0.5  (0-0.8)  10^3/ul


 


Absolute Eos (auto)    0.1  (0-0.6)  10^3/ul


 


Absolute Basos (auto)    0.1  (0-0.2)  10^3/ul


 


Absolute Nucleated RBC    0  10^3/ul


 


Nucleated RBC %    0  


 


Sodium     (133-145)  mmol/L


 


Potassium     (3.5-5.0)  mmol/L


 


Chloride     (101-111)  mmol/L


 


Carbon Dioxide     (22-32)  mmol/L


 


Anion Gap     (2-11)  mmol/L


 


BUN   17   (6-24)  mg/dL


 


Creatinine     (0.51-0.95)  mg/dL


 


Est GFR ( Amer)     (>60)  


 


Est GFR (Non-Af Amer)     (>60)  


 


BUN/Creatinine Ratio     (8-20)  


 


Glucose     ()  mg/dL


 


Lactic Acid  1.7    (0.5-2.0)  mmol/L


 


Calcium     (8.6-10.3)  mg/dL


 


Total Bilirubin     (0.2-1.0)  mg/dL


 


AST     (13-39)  U/L


 


ALT     (7-52)  U/L


 


Alkaline Phosphatase     ()  U/L


 


Ammonia     (16-53)  mol/L


 


Troponin I     (<0.04)  ng/mL


 


Total Protein     (6.4-8.9)  g/dL


 


Albumin     (3.2-5.2)  g/dL


 


Globulin     (2-4)  g/dL


 


Albumin/Globulin Ratio     (1-3)  


 


Vitamin B12     (180-914)  pg/mL


 


TSH     (0.34-5.60)  mcIU/mL


 


Free T4     (0.61-1.12)  ng/dL


 


Thyroxine (T4)     (6.09-12.23)  mcg/mL


 


Free T3     (2.5-3.9)  pg/mL


 


Total T3     (0.87-1.78)  ng/mL


 


Urine Color     


 


Urine Appearance     


 


Urine pH     (5-9)  


 


Ur Specific Gravity     (1.010-1.030)  


 


Urine Protein     (Negative)  


 


Urine Ketones     (Negative)  


 


Urine Blood     (Negative)  


 


Urine Nitrate     (Negative)  


 


Urine Bilirubin     (Negative)  


 


Urine Urobilinogen     (Negative)  


 


Ur Leukocyte Esterase     (Negative)  


 


Urine Glucose     (Negative)  














  18 Range/Units





  19:37 


 


WBC   (3.5-10.8)  10^3/ul


 


RBC   (4.0-5.4)  10^6/ul


 


Hgb   (12.0-16.0)  g/dl


 


Hct   (35-47)  %


 


MCV   (80-97)  fL


 


MCH   (27-31)  pg


 


MCHC   (31-36)  g/dl


 


RDW   (10.5-15)  %


 


Plt Count   (150-450)  10^3/ul


 


MPV   (7.4-10.4)  um3


 


Neut % (Auto)   (38-83)  %


 


Lymph % (Auto)   (25-47)  %


 


Mono % (Auto)   (0-7)  %


 


Eos % (Auto)   (0-6)  %


 


Baso % (Auto)   (0-2)  %


 


Absolute Neuts (auto)   (1.5-7.7)  10^3/ul


 


Absolute Lymphs (auto)   (1.0-4.8)  10^3/ul


 


Absolute Monos (auto)   (0-0.8)  10^3/ul


 


Absolute Eos (auto)   (0-0.6)  10^3/ul


 


Absolute Basos (auto)   (0-0.2)  10^3/ul


 


Absolute Nucleated RBC   10^3/ul


 


Nucleated RBC %   


 


Sodium   (133-145)  mmol/L


 


Potassium   (3.5-5.0)  mmol/L


 


Chloride   (101-111)  mmol/L


 


Carbon Dioxide   (22-32)  mmol/L


 


Anion Gap   (2-11)  mmol/L


 


BUN   (6-24)  mg/dL


 


Creatinine   (0.51-0.95)  mg/dL


 


Est GFR ( Amer)   (>60)  


 


Est GFR (Non-Af Amer)   (>60)  


 


BUN/Creatinine Ratio   (8-20)  


 


Glucose   ()  mg/dL


 


Lactic Acid   (0.5-2.0)  mmol/L


 


Calcium   (8.6-10.3)  mg/dL


 


Total Bilirubin   (0.2-1.0)  mg/dL


 


AST   (13-39)  U/L


 


ALT   (7-52)  U/L


 


Alkaline Phosphatase   ()  U/L


 


Ammonia  38  (16-53)  mol/L


 


Troponin I   (<0.04)  ng/mL


 


Total Protein   (6.4-8.9)  g/dL


 


Albumin   (3.2-5.2)  g/dL


 


Globulin   (2-4)  g/dL


 


Albumin/Globulin Ratio   (1-3)  


 


Vitamin B12   (180-914)  pg/mL


 


TSH   (0.34-5.60)  mcIU/mL


 


Free T4   (0.61-1.12)  ng/dL


 


Thyroxine (T4)   (6.09-12.23)  mcg/mL


 


Free T3   (2.5-3.9)  pg/mL


 


Total T3   (0.87-1.78)  ng/mL


 


Urine Color   


 


Urine Appearance   


 


Urine pH   (5-9)  


 


Ur Specific Gravity   (1.010-1.030)  


 


Urine Protein   (Negative)  


 


Urine Ketones   (Negative)  


 


Urine Blood   (Negative)  


 


Urine Nitrate   (Negative)  


 


Urine Bilirubin   (Negative)  


 


Urine Urobilinogen   (Negative)  


 


Ur Leukocyte Esterase   (Negative)  


 


Urine Glucose   (Negative)  











Diagnostic Imaging: 





MRI BRAIN





Patient Name:         HARMONY MORELOS                                             

                        Medical Record#: [id number]


Ordering Physician: Elaine Antonio MD                                             

                        Acct.#: [id number]


:     1932         Age: 85   Sex: F                                   

                        Location: [address] [place] [address] MEDICAL


Exam Date: 18 1601                                                       

                        ADM Status: ADM Joann


Order Information:                         MRI BRAIN W/O


Accession Number:                          [id number]


CPT:                                       [id number]


Indication: Dementia. Mental status change.





Comparison: CT brain of one day prior.





Technique: [employer] 1.5 Jesica [id number] with GEM suite.  MRI brain without 

contrast. 





Report: Diffusion series is negative for acute or subacute ischemia. 

Susceptibility series


is negative for stigmata of hemosiderin deposition to indicate previous 

hemorrhage.


Moderate prominence of the cerebral sulci reflecting volume loss. Unremarkable 

ventricles


and basal cisterns.





Increased T2 signal in the periventricular and subcortical white matter without 

mass


effect most consistent with chronic small vessel ischemic disease. No intra or 

extra-axial


fluid collection evident. Unremarkable orbital contents.





Preserved major intracranial flow-voids.





Indolent thickening of the inner table of the frontal bone. No suspicious 

calvarial or


skull base fractures evident. Clear paranasal sinuses and mastoid air spaces. 

Unremarkable


scalp.





IMPRESSION: Involutional change and stigmata of chronic small vessel ischemic 

disease. No


evidence for acute or subacute ischemia or other acute intracranial process.





____________________________________________________________


<Electronically signed by Sd Chirinos MD in OV>  18


Dictated By: dS Chirinos MD


Dictated Date/Time: 18


Transcribed Date/Time: 18


Copy to:











HEAD CT





Patient Name:         HARMONY MORELOS                                             

                        Medical Record#: [id number]


Ordering Physician: Kali Michelle MD                                           

                        Acct.#: [id number]


:     1932         Age: 85   Sex: F                                   

                        Location: EMERGENCY DEPARTMENT


Exam Date: 18                                                       

                        ADM Status: REG ER


Order Information:                         CT BRAIN WO


Accession Number:                          [id number]


CPT:                                       [id number]


Indication: Altered mental status.





Comparison: No prior exams available on the [facility] PACS for comparison.





Technique: Noncontrast CT vertex of skull through foramen magnum.





Report: Moderately severe prominence of the cerebral sulci and moderate 

prominence of the


cerebellar fissures reflecting atrophy. Unremarkable ventricles and basal 

cisterns.


Decreased density in the periventricular and subcortical white matter while non-

specific


is most likely due to chronic microangiopathy. Negative for gray matter white 

matter


obscuration, intra or extra-axial hemorrhage, or mass effect. Unremarkable 

partially


visualized orbital contents.





No fracture or suspicious calvarial or skull base lesion evident. Indolent 

thickening of


the inner table of the frontal bone noted. Clear visualized paranasal sinuses 

and mastoid


air spaces. Unremarkable scalp.





IMPRESSION: 


1. No acute intracranial process evident.


2. Involutional change and stigmata of chronic small vessel ischemic disease.





____________________________________________________________


<Electronically signed by Sd Chirinos MD in OV>  18


Dictated By: Sd Chirinos MD


Dictated Date/Time: 18


Transcribed Date/Time: 18


Copy to:











Assess/Plan/Problems-Billing


Assessment: 





This is a very confused 85 year old female with history of dementia with more 

severe features last 48 hours and no clear etiology.





- Patient Problems


(1) Altered mental status


Code(s): R41.82 - ALTERED MENTAL STATUS, UNSPECIFIED   SNOMED Code(s): 911768135


   Comment: 


 - Baseline dementia, but now with bizarre behavior and not sleeping


 - CT head negative for any acute pathology


 - MRI with no changes


 - Does not appear to have infectious origin


 - Change to seroquel tonight PRN, DC zyprexa and increase remeron dose


 - Dr. Antonio following   





(2) Hypothyroid


Code(s): E03.9 - HYPOTHYROIDISM, UNSPECIFIED   SNOMED Code(s): 29960017


   Comment: 


 - TSH 0.03, will continue to hold synthroid


 - overmedication with synthroid may have been contributing factor


 - Recheck TSH in a week   





(3) Full code status


Code(s): Z78.9 - OTHER SPECIFIED HEALTH STATUS   SNOMED Code(s): 862571593


   


Status and Disposition: 





Remain inpatient for symptom management. Discussed with son, plan for assisted 

living in [place].


Counseling and/or Coordination of Care Minutes: coordinated with Dr. Antonio and 

patient's son/POA

## 2018-02-28 NOTE — RAD
INDICATION: Cough     



COMPARISON: None

 

TECHNIQUE: An AP portable view obtained at 0025 hours is submitted.



FINDINGS: 



Bones/Soft Tissues: There are no acute bony findings. There is left chest wall and

axillary surgery



Cardiomediastinal: The cardiomediastinal silhouette is normal. 



Lungs: There are no focal infiltrates. There is diffuse increase in interstitial markings.



Pleura: There are no pleural effusions. 



Other: None



IMPRESSION:  POSTOPERATIVE CHANGE LEFT CHEST WALL AND AXILLA. DIFFUSE INTERSTITIAL

PROMINENCE. THIS COULD BE ACUTE OR CHRONIC.

## 2018-03-01 LAB
BASOPHILS # BLD AUTO: 0 10^3/UL (ref 0–0.2)
EOSINOPHIL # BLD AUTO: 0.4 10^3/UL (ref 0–0.6)
HCT VFR BLD AUTO: 32 % (ref 35–47)
HGB BLD-MCNC: 10.5 G/DL (ref 12–16)
LYMPHOCYTES # BLD AUTO: 2.9 10^3/UL (ref 1–4.8)
MCH RBC QN AUTO: 32 PG (ref 27–31)
MCHC RBC AUTO-ENTMCNC: 33 G/DL (ref 31–36)
MCV RBC AUTO: 96 FL (ref 80–97)
MONOCYTES # BLD AUTO: 0.5 10^3/UL (ref 0–0.8)
NEUTROPHILS # BLD AUTO: 3 10^3/UL (ref 1.5–7.7)
NRBC # BLD AUTO: 0 10^3/UL
NRBC BLD QL AUTO: 0
PLATELET # BLD AUTO: 160 10^3/UL (ref 150–450)
RBC # BLD AUTO: 3.27 10^6/UL (ref 4–5.4)
WBC # BLD AUTO: 6.8 10^3/UL (ref 3.5–10.8)

## 2018-03-01 RX ADMIN — HEPARIN SODIUM SCH UNITS: 5000 INJECTION INTRAVENOUS; SUBCUTANEOUS at 06:14

## 2018-03-01 RX ADMIN — CYANOCOBALAMIN TAB 500 MCG SCH: 500 TAB at 08:02

## 2018-03-01 RX ADMIN — HEPARIN SODIUM SCH: 5000 INJECTION INTRAVENOUS; SUBCUTANEOUS at 14:49

## 2018-03-01 RX ADMIN — HEPARIN SODIUM SCH UNITS: 5000 INJECTION INTRAVENOUS; SUBCUTANEOUS at 22:30

## 2018-03-01 RX ADMIN — CITALOPRAM HYDROBROMIDE SCH: 10 TABLET ORAL at 08:02

## 2018-03-01 RX ADMIN — MIRTAZAPINE SCH: 15 TABLET, FILM COATED ORAL at 22:39

## 2018-03-01 NOTE — PN
Subjective


Date of Service: 18


Interval History: 





Patient seen and examined. Has been sleeping since last night. Arousable, 

states she's very tired when woken up. No complaints other than fatigue. 1:1 

monitor in room.





Objective


Active Medications: 








Acetaminophen (Tylenol Tab*)  650 mg PO Q4H PRN


   PRN Reason: FEVER/PAIN


Citalopram Hydrobromide (Celexa Tab*)  10 mg PO QAM [facility]


   PRN Reason: Protocol


   Last Admin: 18 08:02 Dose:  Not Given


Cyanocobalamin (Vitamin B12 Tab*)  500 mcg PO QAM [facility]


   Last Admin: 18 08:02 Dose:  Not Given


Haloperidol Lactate (Haldol Inj Iv/Im*)  5 mg IM Q6H PRN


   PRN Reason: AGITATION


   Last Admin: 18 23:59 Dose:  5 mg


Heparin Sodium (Porcine) (Heparin Vial(*))  5,000 units SUBCUT Q8HR [facility]


   Last Admin: 18 14:49 Dose:  Not Given


Sodium Chloride (Ns 0.9% 1000 Ml*)  1,000 mls @ 75 mls/hr IV PER RATE [facility]


   Last Admin: 18 12:33 Dose:  75 mls/hr


Mirtazapine (Remeron Tab*)  30 mg PO BEDTIME [facility]


   Last Admin: 18 23:52 Dose:  30 mg


Quetiapine Fumarate (Seroquel Tab*)  25 mg PO BEDTIME PRN


   PRN Reason: AGITATION








 Vital Signs - 8 hr











  18





  11:19 15:24


 


Temperature 98.7 F 98.3 F


 


Pulse Rate 76 79


 


Respiratory 19 20





Rate  


 


Blood Pressure 112/77 133/53





(mmHg)  


 


O2 Sat by Pulse 95 95





Oximetry  











Oxygen Devices in Use Now: None


Appearance: sleepy, arousable, NAD


Eyes: No Scleral Icterus, PERRLA


Ears/Nose/Mouth/Throat: NL Teeth, Lips, Gums


Neck: Trachea Midline


Respiratory: Symmetrical Chest Expansion and Respiratory Effort, Clear to 

Auscultation


Cardiovascular: NL Sounds; No Murmurs; No JVD, RRR, No Edema


Abdominal: NL Sounds; No Tenderness; No Distention


Neurological: - - Oriented to person


Nutrition: Taking PO's


Result Diagrams: 


 18 05:51





 18 05:48


Additional Lab and Data: 


 Lab Results











  18 Range/Units





  15:33 15:59 15:59 


 


WBC   8.9   (3.5-10.8)  10^3/ul


 


RBC   3.33 L   (4.0-5.4)  10^6/ul


 


Hgb   10.8 L   (12.0-16.0)  g/dl


 


Hct   32 L   (35-47)  %


 


MCV   96   (80-97)  fL


 


MCH   32 H   (27-31)  pg


 


MCHC   34   (31-36)  g/dl


 


RDW   16 H   (10.5-15)  %


 


Plt Count   163   (150-450)  10^3/ul


 


MPV   8   (7.4-10.4)  um3


 


Neut % (Auto)   56.1   (38-83)  %


 


Lymph % (Auto)   35.0   (25-47)  %


 


Mono % (Auto)   7.1 H   (0-7)  %


 


Eos % (Auto)   1.3   (0-6)  %


 


Baso % (Auto)   0.5   (0-2)  %


 


Absolute Neuts (auto)   5.0   (1.5-7.7)  10^3/ul


 


Absolute Lymphs (auto)   3.1   (1.0-4.8)  10^3/ul


 


Absolute Monos (auto)   0.6   (0-0.8)  10^3/ul


 


Absolute Eos (auto)   0.1   (0-0.6)  10^3/ul


 


Absolute Basos (auto)   0   (0-0.2)  10^3/ul


 


Absolute Nucleated RBC   0   10^3/ul


 


Nucleated RBC %   0.1   


 


Sodium    141  (133-145)  mmol/L


 


Potassium    4.3  (3.5-5.0)  mmol/L


 


Chloride    109  (101-111)  mmol/L


 


Carbon Dioxide    28  (22-32)  mmol/L


 


Anion Gap    4  (2-11)  mmol/L


 


BUN    17  (6-24)  mg/dL


 


Creatinine    0.90  (0.51-0.95)  mg/dL


 


Est GFR ( Amer)    76.5  (>60)  


 


Est GFR (Non-Af Amer)    59.5  (>60)  


 


BUN/Creatinine Ratio    18.9  (8-20)  


 


Glucose    115 H  ()  mg/dL


 


Lactic Acid     (0.5-2.0)  mmol/L


 


Calcium    9.2  (8.6-10.3)  mg/dL


 


Total Bilirubin    0.30  (0.2-1.0)  mg/dL


 


AST    15  (13-39)  U/L


 


ALT    5 L  (7-52)  U/L


 


Alkaline Phosphatase    43  ()  U/L


 


Ammonia     (16-53)  mol/L


 


Troponin I    0.00  (<0.04)  ng/mL


 


Total Protein    6.6  (6.4-8.9)  g/dL


 


Albumin    3.4  (3.2-5.2)  g/dL


 


Globulin    3.2  (2-4)  g/dL


 


Albumin/Globulin Ratio    1.1  (1-3)  


 


Vitamin B12    457  (180-914)  pg/mL


 


TSH    0.03 L  (0.34-5.60)  mcIU/mL


 


Free T4    1.41 H  (0.61-1.12)  ng/dL


 


Thyroxine (T4)    9.68  (6.09-12.23)  mcg/mL


 


Free T3    2.80  (2.5-3.9)  pg/mL


 


Total T3    0.71 L  (0.87-1.78)  ng/mL


 


Urine Color  Yellow    


 


Urine Appearance  Cloudy    


 


Urine pH  7.0    (5-9)  


 


Ur Specific Gravity  1.006 L    (1.010-1.030)  


 


Urine Protein  Negative    (Negative)  


 


Urine Ketones  Negative    (Negative)  


 


Urine Blood  Negative    (Negative)  


 


Urine Nitrate  Negative    (Negative)  


 


Urine Bilirubin  Negative    (Negative)  


 


Urine Urobilinogen  Negative    (Negative)  


 


Ur Leukocyte Esterase  Negative    (Negative)  


 


Urine Glucose  Negative    (Negative)  














  18 Range/Units





  15:59 19:37 19:37 


 


WBC    8.6  (3.5-10.8)  10^3/ul


 


RBC    3.34 L  (4.0-5.4)  10^6/ul


 


Hgb    10.7 L  (12.0-16.0)  g/dl


 


Hct    32 L  (35-47)  %


 


MCV    96  (80-97)  fL


 


MCH    32 H  (27-31)  pg


 


MCHC    33  (31-36)  g/dl


 


RDW    15  (10.5-15)  %


 


Plt Count    169  (150-450)  10^3/ul


 


MPV    8  (7.4-10.4)  um3


 


Neut % (Auto)    45.7  (38-83)  %


 


Lymph % (Auto)    46.3  (25-47)  %


 


Mono % (Auto)    6.1  (0-7)  %


 


Eos % (Auto)    1.2  (0-6)  %


 


Baso % (Auto)    0.7  (0-2)  %


 


Absolute Neuts (auto)    3.9  (1.5-7.7)  10^3/ul


 


Absolute Lymphs (auto)    4.0  (1.0-4.8)  10^3/ul


 


Absolute Monos (auto)    0.5  (0-0.8)  10^3/ul


 


Absolute Eos (auto)    0.1  (0-0.6)  10^3/ul


 


Absolute Basos (auto)    0.1  (0-0.2)  10^3/ul


 


Absolute Nucleated RBC    0  10^3/ul


 


Nucleated RBC %    0  


 


Sodium     (133-145)  mmol/L


 


Potassium     (3.5-5.0)  mmol/L


 


Chloride     (101-111)  mmol/L


 


Carbon Dioxide     (22-32)  mmol/L


 


Anion Gap     (2-11)  mmol/L


 


BUN   17   (6-24)  mg/dL


 


Creatinine     (0.51-0.95)  mg/dL


 


Est GFR ( Amer)     (>60)  


 


Est GFR (Non-Af Amer)     (>60)  


 


BUN/Creatinine Ratio     (8-20)  


 


Glucose     ()  mg/dL


 


Lactic Acid  1.7    (0.5-2.0)  mmol/L


 


Calcium     (8.6-10.3)  mg/dL


 


Total Bilirubin     (0.2-1.0)  mg/dL


 


AST     (13-39)  U/L


 


ALT     (7-52)  U/L


 


Alkaline Phosphatase     ()  U/L


 


Ammonia     (16-53)  mol/L


 


Troponin I     (<0.04)  ng/mL


 


Total Protein     (6.4-8.9)  g/dL


 


Albumin     (3.2-5.2)  g/dL


 


Globulin     (2-4)  g/dL


 


Albumin/Globulin Ratio     (1-3)  


 


Vitamin B12     (180-914)  pg/mL


 


TSH     (0.34-5.60)  mcIU/mL


 


Free T4     (0.61-1.12)  ng/dL


 


Thyroxine (T4)     (6.09-12.23)  mcg/mL


 


Free T3     (2.5-3.9)  pg/mL


 


Total T3     (0.87-1.78)  ng/mL


 


Urine Color     


 


Urine Appearance     


 


Urine pH     (5-9)  


 


Ur Specific Gravity     (1.010-1.030)  


 


Urine Protein     (Negative)  


 


Urine Ketones     (Negative)  


 


Urine Blood     (Negative)  


 


Urine Nitrate     (Negative)  


 


Urine Bilirubin     (Negative)  


 


Urine Urobilinogen     (Negative)  


 


Ur Leukocyte Esterase     (Negative)  


 


Urine Glucose     (Negative)  














  18 Range/Units





  19:37 


 


WBC   (3.5-10.8)  10^3/ul


 


RBC   (4.0-5.4)  10^6/ul


 


Hgb   (12.0-16.0)  g/dl


 


Hct   (35-47)  %


 


MCV   (80-97)  fL


 


MCH   (27-31)  pg


 


MCHC   (31-36)  g/dl


 


RDW   (10.5-15)  %


 


Plt Count   (150-450)  10^3/ul


 


MPV   (7.4-10.4)  um3


 


Neut % (Auto)   (38-83)  %


 


Lymph % (Auto)   (25-47)  %


 


Mono % (Auto)   (0-7)  %


 


Eos % (Auto)   (0-6)  %


 


Baso % (Auto)   (0-2)  %


 


Absolute Neuts (auto)   (1.5-7.7)  10^3/ul


 


Absolute Lymphs (auto)   (1.0-4.8)  10^3/ul


 


Absolute Monos (auto)   (0-0.8)  10^3/ul


 


Absolute Eos (auto)   (0-0.6)  10^3/ul


 


Absolute Basos (auto)   (0-0.2)  10^3/ul


 


Absolute Nucleated RBC   10^3/ul


 


Nucleated RBC %   


 


Sodium   (133-145)  mmol/L


 


Potassium   (3.5-5.0)  mmol/L


 


Chloride   (101-111)  mmol/L


 


Carbon Dioxide   (22-32)  mmol/L


 


Anion Gap   (2-11)  mmol/L


 


BUN   (6-24)  mg/dL


 


Creatinine   (0.51-0.95)  mg/dL


 


Est GFR ( Amer)   (>60)  


 


Est GFR (Non-Af Amer)   (>60)  


 


BUN/Creatinine Ratio   (8-20)  


 


Glucose   ()  mg/dL


 


Lactic Acid   (0.5-2.0)  mmol/L


 


Calcium   (8.6-10.3)  mg/dL


 


Total Bilirubin   (0.2-1.0)  mg/dL


 


AST   (13-39)  U/L


 


ALT   (7-52)  U/L


 


Alkaline Phosphatase   ()  U/L


 


Ammonia  38  (16-53)  mol/L


 


Troponin I   (<0.04)  ng/mL


 


Total Protein   (6.4-8.9)  g/dL


 


Albumin   (3.2-5.2)  g/dL


 


Globulin   (2-4)  g/dL


 


Albumin/Globulin Ratio   (1-3)  


 


Vitamin B12   (180-914)  pg/mL


 


TSH   (0.34-5.60)  mcIU/mL


 


Free T4   (0.61-1.12)  ng/dL


 


Thyroxine (T4)   (6.09-12.23)  mcg/mL


 


Free T3   (2.5-3.9)  pg/mL


 


Total T3   (0.87-1.78)  ng/mL


 


Urine Color   


 


Urine Appearance   


 


Urine pH   (5-9)  


 


Ur Specific Gravity   (1.010-1.030)  


 


Urine Protein   (Negative)  


 


Urine Ketones   (Negative)  


 


Urine Blood   (Negative)  


 


Urine Nitrate   (Negative)  


 


Urine Bilirubin   (Negative)  


 


Urine Urobilinogen   (Negative)  


 


Ur Leukocyte Esterase   (Negative)  


 


Urine Glucose   (Negative)  











Diagnostic Imaging: 





MRI BRAIN





Patient Name:         HARMONY MORELOS                                             

                        Medical Record#: [id number]


Ordering Physician: Elaine Antonio MD                                             

                        Acct.#: [id number]


:     1932         Age: 85   Sex: F                                   

                        Location: [address] [place] [address] MEDICAL


Exam Date: 18 1601                                                       

                        ADM Status: ADM Joann


Order Information:                         MRI BRAIN W/O


Accession Number:                          [id number]


CPT:                                       [id number]


Indication: Dementia. Mental status change.





Comparison: CT brain of one day prior.





Technique: [employer]ioa 1.5 Jesica [id number] with GEM suite.  MRI brain without 

contrast. 





Report: Diffusion series is negative for acute or subacute ischemia. 

Susceptibility series


is negative for stigmata of hemosiderin deposition to indicate previous 

hemorrhage.


Moderate prominence of the cerebral sulci reflecting volume loss. Unremarkable 

ventricles


and basal cisterns.





Increased T2 signal in the periventricular and subcortical white matter without 

mass


effect most consistent with chronic small vessel ischemic disease. No intra or 

extra-axial


fluid collection evident. Unremarkable orbital contents.





Preserved major intracranial flow-voids.





Indolent thickening of the inner table of the frontal bone. No suspicious 

calvarial or


skull base fractures evident. Clear paranasal sinuses and mastoid air spaces. 

Unremarkable


scalp.





IMPRESSION: Involutional change and stigmata of chronic small vessel ischemic 

disease. No


evidence for acute or subacute ischemia or other acute intracranial process.





____________________________________________________________


<Electronically signed by Sd Chirinos MD in OV>  18


Dictated By: Sd Chirinos MD


Dictated Date/Time: 18


Transcribed Date/Time: 18


Copy to:











HEAD CT





Patient Name:         HARMONY MORELOS                                             

                        Medical Record#: [id number]


Ordering Physician: Kali Michelle MD                                           

                        Acct.#: [id number]


:     1932         Age: 85   Sex: F                                   

                        Location: EMERGENCY DEPARTMENT


Exam Date: 18                                                       

                        ADM Status: REG ER


Order Information:                         CT BRAIN WO


Accession Number:                          [id number]


CPT:                                       [id number]


Indication: Altered mental status.





Comparison: No prior exams available on the [facility] PACS for comparison.





Technique: Noncontrast CT vertex of skull through foramen magnum.





Report: Moderately severe prominence of the cerebral sulci and moderate 

prominence of the


cerebellar fissures reflecting atrophy. Unremarkable ventricles and basal 

cisterns.


Decreased density in the periventricular and subcortical white matter while non-

specific


is most likely due to chronic microangiopathy. Negative for gray matter white 

matter


obscuration, intra or extra-axial hemorrhage, or mass effect. Unremarkable 

partially


visualized orbital contents.





No fracture or suspicious calvarial or skull base lesion evident. Indolent 

thickening of


the inner table of the frontal bone noted. Clear visualized paranasal sinuses 

and mastoid


air spaces. Unremarkable scalp.





IMPRESSION: 


1. No acute intracranial process evident.


2. Involutional change and stigmata of chronic small vessel ischemic disease.





____________________________________________________________


<Electronically signed by Sd Chirinos MD in OV>  18


Dictated By: Sd Chirinos MD


Dictated Date/Time: 18


Transcribed Date/Time: 18


Copy to:











Assess/Plan/Problems-Billing


Assessment: 





This is a very confused 85 year old female with history of dementia with more 

severe features last 48 hours and no clear etiology, responding to PRN haldol.





- Patient Problems


(1) Altered mental status


Code(s): R41.82 - ALTERED MENTAL STATUS, UNSPECIFIED   SNOMED Code(s): 621391597


   Comment: 


 - Baseline dementia, but now with bizarre behavior and not sleeping


 - Patient had been awake for days and agitated, resting comfortably now


 - CT head negative for any acute pathology


 - MRI with no changes


 - Does not appear to have infectious origin


 - Remeron increased last night and seroquel PRN, would hold seroquel for 

sedation


 - Dr. Antonio following   





(2) Hypothyroid


Code(s): E03.9 - HYPOTHYROIDISM, UNSPECIFIED   SNOMED Code(s): 89492067


   Comment: 


 - TSH 0.03, will continue to hold synthroid


 - overmedication with synthroid may have been contributing factor


 - Recheck TSH in AM   





(3) Full code status


Code(s): Z78.9 - OTHER SPECIFIED HEALTH STATUS   SNOMED Code(s): 790050194


   


Status and Disposition: 





Remain inpatient for symptom management, has bed in [facility] assisted living for 

monday. Will continue to titrate meds for effect and transfer when stable.


Counseling and/or Coordination of Care Minutes: coordinated with Dr. Antonio.

## 2018-03-02 RX ADMIN — HEPARIN SODIUM SCH UNITS: 5000 INJECTION INTRAVENOUS; SUBCUTANEOUS at 14:34

## 2018-03-02 RX ADMIN — HEPARIN SODIUM SCH UNITS: 5000 INJECTION INTRAVENOUS; SUBCUTANEOUS at 21:11

## 2018-03-02 RX ADMIN — MIRTAZAPINE SCH MG: 15 TABLET, FILM COATED ORAL at 21:10

## 2018-03-02 RX ADMIN — HEPARIN SODIUM SCH UNITS: 5000 INJECTION INTRAVENOUS; SUBCUTANEOUS at 05:38

## 2018-03-02 RX ADMIN — CITALOPRAM HYDROBROMIDE SCH MG: 10 TABLET ORAL at 08:56

## 2018-03-02 RX ADMIN — CYANOCOBALAMIN TAB 500 MCG SCH MCG: 500 TAB at 08:56

## 2018-03-02 NOTE — PN
Subjective


Date of Service: 18


Interval History: 





Patient seen and examined. Appears more confused, hands are trembling. No 

agitation noted, more quiet than she's been in the last few days. Denies pain, 

but unable to obtain full ROS 2/2 condition.





Objective


Active Medications: 








Acetaminophen (Tylenol Tab*)  650 mg PO Q4H PRN


   PRN Reason: FEVER/PAIN


Citalopram Hydrobromide (Celexa Tab*)  10 mg PO QAM [facility]


   PRN Reason: Protocol


   Last Admin: 18 08:02 Dose:  Not Given


Cyanocobalamin (Vitamin B12 Tab*)  500 mcg PO QAM [facility]


   Last Admin: 18 08:02 Dose:  Not Given


Haloperidol Lactate (Haldol Inj Iv/Im*)  5 mg IM Q6H PRN


   PRN Reason: AGITATION


   Last Admin: 18 23:59 Dose:  5 mg


Heparin Sodium (Porcine) (Heparin Vial(*))  5,000 units SUBCUT Q8HR [facility]


   Last Admin: 18 05:38 Dose:  5,000 units


Sodium Chloride (Ns 0.9% 1000 Ml*)  1,000 mls @ 75 mls/hr IV PER RATE [facility]


   Last Admin: 18 12:33 Dose:  75 mls/hr


Mirtazapine (Remeron Tab*)  30 mg PO BEDTIME [facility]


   Last Admin: 18 22:39 Dose:  Not Given


Quetiapine Fumarate (Seroquel Tab*)  25 mg PO BEDTIME PRN


   PRN Reason: AGITATION








 Vital Signs - 8 hr











  18





  03:05


 


Temperature 98.8 F


 


Pulse Rate 80


 


Respiratory 16





Rate 


 


Blood Pressure 144/63





(mmHg) 


 


O2 Sat by Pulse 95





Oximetry 











Oxygen Devices in Use Now: None


Appearance: Alert, confused


Eyes: No Scleral Icterus, PERRLA


Ears/Nose/Mouth/Throat: Mucous Membranes Moist


Neck: Trachea Midline


Respiratory: Symmetrical Chest Expansion and Respiratory Effort, Clear to 

Auscultation


Cardiovascular: NL Sounds; No Murmurs; No JVD, RRR


Extremities: No Edema, No Clubbing, Cyanosis


Skin: No Rash or Ulcers


Neurological: - - confused, alert


Nutrition: Taking PO's, - - needs encouragement to take breakfast


Result Diagrams: 


 18 05:51





 18 05:48


Additional Lab and Data: 


 Lab Results











  18 Range/Units





  15:33 15:59 15:59 


 


WBC   8.9   (3.5-10.8)  10^3/ul


 


RBC   3.33 L   (4.0-5.4)  10^6/ul


 


Hgb   10.8 L   (12.0-16.0)  g/dl


 


Hct   32 L   (35-47)  %


 


MCV   96   (80-97)  fL


 


MCH   32 H   (27-31)  pg


 


MCHC   34   (31-36)  g/dl


 


RDW   16 H   (10.5-15)  %


 


Plt Count   163   (150-450)  10^3/ul


 


MPV   8   (7.4-10.4)  um3


 


Neut % (Auto)   56.1   (38-83)  %


 


Lymph % (Auto)   35.0   (25-47)  %


 


Mono % (Auto)   7.1 H   (0-7)  %


 


Eos % (Auto)   1.3   (0-6)  %


 


Baso % (Auto)   0.5   (0-2)  %


 


Absolute Neuts (auto)   5.0   (1.5-7.7)  10^3/ul


 


Absolute Lymphs (auto)   3.1   (1.0-4.8)  10^3/ul


 


Absolute Monos (auto)   0.6   (0-0.8)  10^3/ul


 


Absolute Eos (auto)   0.1   (0-0.6)  10^3/ul


 


Absolute Basos (auto)   0   (0-0.2)  10^3/ul


 


Absolute Nucleated RBC   0   10^3/ul


 


Nucleated RBC %   0.1   


 


Sodium    141  (133-145)  mmol/L


 


Potassium    4.3  (3.5-5.0)  mmol/L


 


Chloride    109  (101-111)  mmol/L


 


Carbon Dioxide    28  (22-32)  mmol/L


 


Anion Gap    4  (2-11)  mmol/L


 


BUN    17  (6-24)  mg/dL


 


Creatinine    0.90  (0.51-0.95)  mg/dL


 


Est GFR ( Amer)    76.5  (>60)  


 


Est GFR (Non-Af Amer)    59.5  (>60)  


 


BUN/Creatinine Ratio    18.9  (8-20)  


 


Glucose    115 H  ()  mg/dL


 


Lactic Acid     (0.5-2.0)  mmol/L


 


Calcium    9.2  (8.6-10.3)  mg/dL


 


Total Bilirubin    0.30  (0.2-1.0)  mg/dL


 


AST    15  (13-39)  U/L


 


ALT    5 L  (7-52)  U/L


 


Alkaline Phosphatase    43  ()  U/L


 


Ammonia     (16-53)  mol/L


 


Troponin I    0.00  (<0.04)  ng/mL


 


Total Protein    6.6  (6.4-8.9)  g/dL


 


Albumin    3.4  (3.2-5.2)  g/dL


 


Globulin    3.2  (2-4)  g/dL


 


Albumin/Globulin Ratio    1.1  (1-3)  


 


Vitamin B12    457  (180-914)  pg/mL


 


TSH    0.03 L  (0.34-5.60)  mcIU/mL


 


Free T4    1.41 H  (0.61-1.12)  ng/dL


 


Thyroxine (T4)    9.68  (6.09-12.23)  mcg/mL


 


Free T3    2.80  (2.5-3.9)  pg/mL


 


Total T3    0.71 L  (0.87-1.78)  ng/mL


 


Urine Color  Yellow    


 


Urine Appearance  Cloudy    


 


Urine pH  7.0    (5-9)  


 


Ur Specific Gravity  1.006 L    (1.010-1.030)  


 


Urine Protein  Negative    (Negative)  


 


Urine Ketones  Negative    (Negative)  


 


Urine Blood  Negative    (Negative)  


 


Urine Nitrate  Negative    (Negative)  


 


Urine Bilirubin  Negative    (Negative)  


 


Urine Urobilinogen  Negative    (Negative)  


 


Ur Leukocyte Esterase  Negative    (Negative)  


 


Urine Glucose  Negative    (Negative)  














  18 Range/Units





  15:59 19:37 19:37 


 


WBC    8.6  (3.5-10.8)  10^3/ul


 


RBC    3.34 L  (4.0-5.4)  10^6/ul


 


Hgb    10.7 L  (12.0-16.0)  g/dl


 


Hct    32 L  (35-47)  %


 


MCV    96  (80-97)  fL


 


MCH    32 H  (27-31)  pg


 


MCHC    33  (31-36)  g/dl


 


RDW    15  (10.5-15)  %


 


Plt Count    169  (150-450)  10^3/ul


 


MPV    8  (7.4-10.4)  um3


 


Neut % (Auto)    45.7  (38-83)  %


 


Lymph % (Auto)    46.3  (25-47)  %


 


Mono % (Auto)    6.1  (0-7)  %


 


Eos % (Auto)    1.2  (0-6)  %


 


Baso % (Auto)    0.7  (0-2)  %


 


Absolute Neuts (auto)    3.9  (1.5-7.7)  10^3/ul


 


Absolute Lymphs (auto)    4.0  (1.0-4.8)  10^3/ul


 


Absolute Monos (auto)    0.5  (0-0.8)  10^3/ul


 


Absolute Eos (auto)    0.1  (0-0.6)  10^3/ul


 


Absolute Basos (auto)    0.1  (0-0.2)  10^3/ul


 


Absolute Nucleated RBC    0  10^3/ul


 


Nucleated RBC %    0  


 


Sodium     (133-145)  mmol/L


 


Potassium     (3.5-5.0)  mmol/L


 


Chloride     (101-111)  mmol/L


 


Carbon Dioxide     (22-32)  mmol/L


 


Anion Gap     (2-11)  mmol/L


 


BUN   17   (6-24)  mg/dL


 


Creatinine     (0.51-0.95)  mg/dL


 


Est GFR ( Amer)     (>60)  


 


Est GFR (Non-Af Amer)     (>60)  


 


BUN/Creatinine Ratio     (8-20)  


 


Glucose     ()  mg/dL


 


Lactic Acid  1.7    (0.5-2.0)  mmol/L


 


Calcium     (8.6-10.3)  mg/dL


 


Total Bilirubin     (0.2-1.0)  mg/dL


 


AST     (13-39)  U/L


 


ALT     (7-52)  U/L


 


Alkaline Phosphatase     ()  U/L


 


Ammonia     (16-53)  mol/L


 


Troponin I     (<0.04)  ng/mL


 


Total Protein     (6.4-8.9)  g/dL


 


Albumin     (3.2-5.2)  g/dL


 


Globulin     (2-4)  g/dL


 


Albumin/Globulin Ratio     (1-3)  


 


Vitamin B12     (180-914)  pg/mL


 


TSH     (0.34-5.60)  mcIU/mL


 


Free T4     (0.61-1.12)  ng/dL


 


Thyroxine (T4)     (6.09-12.23)  mcg/mL


 


Free T3     (2.5-3.9)  pg/mL


 


Total T3     (0.87-1.78)  ng/mL


 


Urine Color     


 


Urine Appearance     


 


Urine pH     (5-9)  


 


Ur Specific Gravity     (1.010-1.030)  


 


Urine Protein     (Negative)  


 


Urine Ketones     (Negative)  


 


Urine Blood     (Negative)  


 


Urine Nitrate     (Negative)  


 


Urine Bilirubin     (Negative)  


 


Urine Urobilinogen     (Negative)  


 


Ur Leukocyte Esterase     (Negative)  


 


Urine Glucose     (Negative)  














  18 Range/Units





  19:37 


 


WBC   (3.5-10.8)  10^3/ul


 


RBC   (4.0-5.4)  10^6/ul


 


Hgb   (12.0-16.0)  g/dl


 


Hct   (35-47)  %


 


MCV   (80-97)  fL


 


MCH   (27-31)  pg


 


MCHC   (31-36)  g/dl


 


RDW   (10.5-15)  %


 


Plt Count   (150-450)  10^3/ul


 


MPV   (7.4-10.4)  um3


 


Neut % (Auto)   (38-83)  %


 


Lymph % (Auto)   (25-47)  %


 


Mono % (Auto)   (0-7)  %


 


Eos % (Auto)   (0-6)  %


 


Baso % (Auto)   (0-2)  %


 


Absolute Neuts (auto)   (1.5-7.7)  10^3/ul


 


Absolute Lymphs (auto)   (1.0-4.8)  10^3/ul


 


Absolute Monos (auto)   (0-0.8)  10^3/ul


 


Absolute Eos (auto)   (0-0.6)  10^3/ul


 


Absolute Basos (auto)   (0-0.2)  10^3/ul


 


Absolute Nucleated RBC   10^3/ul


 


Nucleated RBC %   


 


Sodium   (133-145)  mmol/L


 


Potassium   (3.5-5.0)  mmol/L


 


Chloride   (101-111)  mmol/L


 


Carbon Dioxide   (22-32)  mmol/L


 


Anion Gap   (2-11)  mmol/L


 


BUN   (6-24)  mg/dL


 


Creatinine   (0.51-0.95)  mg/dL


 


Est GFR ( Amer)   (>60)  


 


Est GFR (Non-Af Amer)   (>60)  


 


BUN/Creatinine Ratio   (8-20)  


 


Glucose   ()  mg/dL


 


Lactic Acid   (0.5-2.0)  mmol/L


 


Calcium   (8.6-10.3)  mg/dL


 


Total Bilirubin   (0.2-1.0)  mg/dL


 


AST   (13-39)  U/L


 


ALT   (7-52)  U/L


 


Alkaline Phosphatase   ()  U/L


 


Ammonia  38  (16-53)  mol/L


 


Troponin I   (<0.04)  ng/mL


 


Total Protein   (6.4-8.9)  g/dL


 


Albumin   (3.2-5.2)  g/dL


 


Globulin   (2-4)  g/dL


 


Albumin/Globulin Ratio   (1-3)  


 


Vitamin B12   (180-914)  pg/mL


 


TSH   (0.34-5.60)  mcIU/mL


 


Free T4   (0.61-1.12)  ng/dL


 


Thyroxine (T4)   (6.09-12.23)  mcg/mL


 


Free T3   (2.5-3.9)  pg/mL


 


Total T3   (0.87-1.78)  ng/mL


 


Urine Color   


 


Urine Appearance   


 


Urine pH   (5-9)  


 


Ur Specific Gravity   (1.010-1.030)  


 


Urine Protein   (Negative)  


 


Urine Ketones   (Negative)  


 


Urine Blood   (Negative)  


 


Urine Nitrate   (Negative)  


 


Urine Bilirubin   (Negative)  


 


Urine Urobilinogen   (Negative)  


 


Ur Leukocyte Esterase   (Negative)  


 


Urine Glucose   (Negative)  











Diagnostic Imaging: 





MRI BRAIN





Patient Name:         HARMONY MORELOS                                             

                        Medical Record#: [id number]


Ordering Physician: Elaine Antonio MD                                             

                        Acct.#: [id number]


:     1932         Age: 85   Sex: F                                   

                        Location: [address] [place] [address] MEDICAL


Exam Date: 18 1601                                                       

                        ADM Status: ADM Joann


Order Information:                         MRI BRAIN W/O


Accession Number:                          [id number]


CPT:                                       [id number]


Indication: Dementia. Mental status change.





Comparison: CT brain of one day prior.





Technique: [employer] 1.5 Jesica [id number] with GEM suite.  MRI brain without 

contrast. 





Report: Diffusion series is negative for acute or subacute ischemia. 

Susceptibility series


is negative for stigmata of hemosiderin deposition to indicate previous 

hemorrhage.


Moderate prominence of the cerebral sulci reflecting volume loss. Unremarkable 

ventricles


and basal cisterns.





Increased T2 signal in the periventricular and subcortical white matter without 

mass


effect most consistent with chronic small vessel ischemic disease. No intra or 

extra-axial


fluid collection evident. Unremarkable orbital contents.





Preserved major intracranial flow-voids.





Indolent thickening of the inner table of the frontal bone. No suspicious 

calvarial or


skull base fractures evident. Clear paranasal sinuses and mastoid air spaces. 

Unremarkable


scalp.





IMPRESSION: Involutional change and stigmata of chronic small vessel ischemic 

disease. No


evidence for acute or subacute ischemia or other acute intracranial process.





____________________________________________________________


<Electronically signed by Sd Chirinos MD in OV>  18


Dictated By: Sd Chirinos MD


Dictated Date/Time: 18


Transcribed Date/Time: 18


Copy to:











HEAD CT





Patient Name:         HARMONY MORELOS                                             

                        Medical Record#: [id number]


Ordering Physician: Kali Michelle MD                                           

                        Acct.#: [id number]


:     1932         Age: 85   Sex: F                                   

                        Location: EMERGENCY DEPARTMENT


Exam Date: 18                                                       

                        ADM Status: REG ER


Order Information:                         CT BRAIN WO


Accession Number:                          [id number]


CPT:                                       [id number]


Indication: Altered mental status.





Comparison: No prior exams available on the [facility] PACS for comparison.





Technique: Noncontrast CT vertex of skull through foramen magnum.





Report: Moderately severe prominence of the cerebral sulci and moderate 

prominence of the


cerebellar fissures reflecting atrophy. Unremarkable ventricles and basal 

cisterns.


Decreased density in the periventricular and subcortical white matter while non-

specific


is most likely due to chronic microangiopathy. Negative for gray matter white 

matter


obscuration, intra or extra-axial hemorrhage, or mass effect. Unremarkable 

partially


visualized orbital contents.





No fracture or suspicious calvarial or skull base lesion evident. Indolent 

thickening of


the inner table of the frontal bone noted. Clear visualized paranasal sinuses 

and mastoid


air spaces. Unremarkable scalp.





IMPRESSION: 


1. No acute intracranial process evident.


2. Involutional change and stigmata of chronic small vessel ischemic disease.





____________________________________________________________


<Electronically signed by Sd Chirinos MD in OV>  18


Dictated By: Sd Chirinos MD


Dictated Date/Time: 18


Transcribed Date/Time: 18


Copy to:











Assess/Plan/Problems-Billing


Assessment: 





This is a very confused 85 year old female with history of dementia with more 

severe features at home and since admission. 





- Patient Problems


(1) Altered mental status


Code(s): R41.82 - ALTERED MENTAL STATUS, UNSPECIFIED   SNOMED Code(s): 019771761


   Comment: 


 - Baseline dementia, slept yesterday and last night, less agitation this AM 

however she does appear to have purposeless behavior (rolling the blanket in bed

, pushing the tray table around)


 - Check CMP to ensure no electrolyte disturbaces, CBC 3/1 unremarkable


 - CT head negative for any acute pathology


 - MRI with no changes


 - Remeron increased, seroquel PRN, would hold seroquel for sedation


 - Dr. Antonio following


 - Behavior appears to be stabilized today   





(2) Hypothyroid


Code(s): E03.9 - HYPOTHYROIDISM, UNSPECIFIED   SNOMED Code(s): 68759165


   Comment: 


 - TSH 0.03, continue to hold synthroid


 - overmedication with synthroid may have been contributing factor


 - Recheck TSH this AM   





(3) Full code status


Code(s): Z78.9 - OTHER SPECIFIED HEALTH STATUS   SNOMED Code(s): 264169480


   


Status and Disposition: 





Remain inpatient for symptom management, has bed in [place] assisted living for 

monday. 


Counseling and/or Coordination of Care Minutes: coordinated with staff

## 2018-03-03 RX ADMIN — CYANOCOBALAMIN TAB 500 MCG SCH MCG: 500 TAB at 10:47

## 2018-03-03 RX ADMIN — CITALOPRAM HYDROBROMIDE SCH MG: 10 TABLET ORAL at 10:47

## 2018-03-03 RX ADMIN — HEPARIN SODIUM SCH: 5000 INJECTION INTRAVENOUS; SUBCUTANEOUS at 06:00

## 2018-03-03 RX ADMIN — MIRTAZAPINE SCH MG: 15 TABLET, FILM COATED ORAL at 19:40

## 2018-03-03 RX ADMIN — HEPARIN SODIUM SCH UNITS: 5000 INJECTION INTRAVENOUS; SUBCUTANEOUS at 13:43

## 2018-03-03 RX ADMIN — HEPARIN SODIUM SCH UNITS: 5000 INJECTION INTRAVENOUS; SUBCUTANEOUS at 20:54

## 2018-03-03 NOTE — PN
Subjective


Date of Service: 18


Interval History: 





Patient is alert and awake sitting up in bed. She has noted confusion. She does 

not know the year but is able to tell me her . She denies any complaints. 











Objective


Active Medications: 








Acetaminophen (Tylenol Tab*)  650 mg PO Q4H PRN


   PRN Reason: FEVER/PAIN


Citalopram Hydrobromide (Celexa Tab*)  10 mg PO QAWW [facility]


   PRN Reason: Protocol


   Last Admin: 18 10:47 Dose:  10 mg


Cyanocobalamin (Vitamin B12 Tab*)  500 mcg PO QAM [facility]


   Last Admin: 18 10:47 Dose:  500 mcg


Heparin Sodium (Porcine) (Heparin Vial(*))  5,000 units SUBCUT Q8HR [facility]


   Last Admin: 18 06:00 Dose:  Not Given


Sodium Chloride (Ns 0.9% 1000 Ml*)  1,000 mls @ 75 mls/hr IV PER RATE [facility]


   Last Admin: 18 12:33 Dose:  75 mls/hr


Mirtazapine (Remeron Tab*)  30 mg PO BEDTIME [facility]


   Last Admin: 18 21:10 Dose:  30 mg


Quetiapine Fumarate (Seroquel Tab*)  25 mg PO BEDTIME PRN


   PRN Reason: AGITATION








 Vital Signs - 8 hr











  18





  07:40 08:00 09:16


 


Temperature   


 


Pulse Rate 73  72


 


Respiratory 16 20 





Rate   


 


Blood Pressure 125/55  





(mmHg)   


 


O2 Sat by Pulse 89  95





Oximetry   














  18





  11:29


 


Temperature 97.7 F


 


Pulse Rate 63


 


Respiratory 17





Rate 


 


Blood Pressure 111/45





(mmHg) 


 


O2 Sat by Pulse 96





Oximetry 











Oxygen Devices in Use Now: None


Appearance: elderly female alert in NAD, confused


Eyes: No Scleral Icterus, PERRLA


Respiratory: Symmetrical Chest Expansion and Respiratory Effort, Clear to 

Auscultation


Cardiovascular: NL Sounds; No Murmurs; No JVD, RRR, No Edema


Abdominal: NL Sounds; No Tenderness; No Distention


Extremities: No Edema, No Clubbing, Cyanosis


Skin: No Rash or Ulcers, No Nodules or Sclerosis


Neurological: NL Sensation, NL Muscle Strength and Tone, - - alert


Lines/Tubes/Other Access: Clean, Dry and Intact Peripheral IV


Nutrition: Taking PO's


Result Diagrams: 


 18 05:51





 18 09:20


Additional Lab and Data: 


 Lab Results











  18 Range/Units





  15:33 15:59 15:59 


 


WBC   8.9   (3.5-10.8)  10^3/ul


 


RBC   3.33 L   (4.0-5.4)  10^6/ul


 


Hgb   10.8 L   (12.0-16.0)  g/dl


 


Hct   32 L   (35-47)  %


 


MCV   96   (80-97)  fL


 


MCH   32 H   (27-31)  pg


 


MCHC   34   (31-36)  g/dl


 


RDW   16 H   (10.5-15)  %


 


Plt Count   163   (150-450)  10^3/ul


 


MPV   8   (7.4-10.4)  um3


 


Neut % (Auto)   56.1   (38-83)  %


 


Lymph % (Auto)   35.0   (25-47)  %


 


Mono % (Auto)   7.1 H   (0-7)  %


 


Eos % (Auto)   1.3   (0-6)  %


 


Baso % (Auto)   0.5   (0-2)  %


 


Absolute Neuts (auto)   5.0   (1.5-7.7)  10^3/ul


 


Absolute Lymphs (auto)   3.1   (1.0-4.8)  10^3/ul


 


Absolute Monos (auto)   0.6   (0-0.8)  10^3/ul


 


Absolute Eos (auto)   0.1   (0-0.6)  10^3/ul


 


Absolute Basos (auto)   0   (0-0.2)  10^3/ul


 


Absolute Nucleated RBC   0   10^3/ul


 


Nucleated RBC %   0.1   


 


Sodium    141  (133-145)  mmol/L


 


Potassium    4.3  (3.5-5.0)  mmol/L


 


Chloride    109  (101-111)  mmol/L


 


Carbon Dioxide    28  (22-32)  mmol/L


 


Anion Gap    4  (2-11)  mmol/L


 


BUN    17  (6-24)  mg/dL


 


Creatinine    0.90  (0.51-0.95)  mg/dL


 


Est GFR ( Amer)    76.5  (>60)  


 


Est GFR (Non-Af Amer)    59.5  (>60)  


 


BUN/Creatinine Ratio    18.9  (8-20)  


 


Glucose    115 H  ()  mg/dL


 


Lactic Acid     (0.5-2.0)  mmol/L


 


Calcium    9.2  (8.6-10.3)  mg/dL


 


Total Bilirubin    0.30  (0.2-1.0)  mg/dL


 


AST    15  (13-39)  U/L


 


ALT    5 L  (7-52)  U/L


 


Alkaline Phosphatase    43  ()  U/L


 


Ammonia     (16-53)  mol/L


 


Troponin I    0.00  (<0.04)  ng/mL


 


Total Protein    6.6  (6.4-8.9)  g/dL


 


Albumin    3.4  (3.2-5.2)  g/dL


 


Globulin    3.2  (2-4)  g/dL


 


Albumin/Globulin Ratio    1.1  (1-3)  


 


Vitamin B12    457  (180-914)  pg/mL


 


TSH    0.03 L  (0.34-5.60)  mcIU/mL


 


Free T4    1.41 H  (0.61-1.12)  ng/dL


 


Thyroxine (T4)    9.68  (6.09-12.23)  mcg/mL


 


Free T3    2.80  (2.5-3.9)  pg/mL


 


Total T3    0.71 L  (0.87-1.78)  ng/mL


 


Urine Color  Yellow    


 


Urine Appearance  Cloudy    


 


Urine pH  7.0    (5-9)  


 


Ur Specific Gravity  1.006 L    (1.010-1.030)  


 


Urine Protein  Negative    (Negative)  


 


Urine Ketones  Negative    (Negative)  


 


Urine Blood  Negative    (Negative)  


 


Urine Nitrate  Negative    (Negative)  


 


Urine Bilirubin  Negative    (Negative)  


 


Urine Urobilinogen  Negative    (Negative)  


 


Ur Leukocyte Esterase  Negative    (Negative)  


 


Urine Glucose  Negative    (Negative)  














  18 Range/Units





  15:59 19:37 19:37 


 


WBC    8.6  (3.5-10.8)  10^3/ul


 


RBC    3.34 L  (4.0-5.4)  10^6/ul


 


Hgb    10.7 L  (12.0-16.0)  g/dl


 


Hct    32 L  (35-47)  %


 


MCV    96  (80-97)  fL


 


MCH    32 H  (27-31)  pg


 


MCHC    33  (31-36)  g/dl


 


RDW    15  (10.5-15)  %


 


Plt Count    169  (150-450)  10^3/ul


 


MPV    8  (7.4-10.4)  um3


 


Neut % (Auto)    45.7  (38-83)  %


 


Lymph % (Auto)    46.3  (25-47)  %


 


Mono % (Auto)    6.1  (0-7)  %


 


Eos % (Auto)    1.2  (0-6)  %


 


Baso % (Auto)    0.7  (0-2)  %


 


Absolute Neuts (auto)    3.9  (1.5-7.7)  10^3/ul


 


Absolute Lymphs (auto)    4.0  (1.0-4.8)  10^3/ul


 


Absolute Monos (auto)    0.5  (0-0.8)  10^3/ul


 


Absolute Eos (auto)    0.1  (0-0.6)  10^3/ul


 


Absolute Basos (auto)    0.1  (0-0.2)  10^3/ul


 


Absolute Nucleated RBC    0  10^3/ul


 


Nucleated RBC %    0  


 


Sodium     (133-145)  mmol/L


 


Potassium     (3.5-5.0)  mmol/L


 


Chloride     (101-111)  mmol/L


 


Carbon Dioxide     (22-32)  mmol/L


 


Anion Gap     (2-11)  mmol/L


 


BUN   17   (6-24)  mg/dL


 


Creatinine     (0.51-0.95)  mg/dL


 


Est GFR ( Amer)     (>60)  


 


Est GFR (Non-Af Amer)     (>60)  


 


BUN/Creatinine Ratio     (8-20)  


 


Glucose     ()  mg/dL


 


Lactic Acid  1.7    (0.5-2.0)  mmol/L


 


Calcium     (8.6-10.3)  mg/dL


 


Total Bilirubin     (0.2-1.0)  mg/dL


 


AST     (13-39)  U/L


 


ALT     (7-52)  U/L


 


Alkaline Phosphatase     ()  U/L


 


Ammonia     (16-53)  mol/L


 


Troponin I     (<0.04)  ng/mL


 


Total Protein     (6.4-8.9)  g/dL


 


Albumin     (3.2-5.2)  g/dL


 


Globulin     (2-4)  g/dL


 


Albumin/Globulin Ratio     (1-3)  


 


Vitamin B12     (180-914)  pg/mL


 


TSH     (0.34-5.60)  mcIU/mL


 


Free T4     (0.61-1.12)  ng/dL


 


Thyroxine (T4)     (6.09-12.23)  mcg/mL


 


Free T3     (2.5-3.9)  pg/mL


 


Total T3     (0.87-1.78)  ng/mL


 


Urine Color     


 


Urine Appearance     


 


Urine pH     (5-9)  


 


Ur Specific Gravity     (1.010-1.030)  


 


Urine Protein     (Negative)  


 


Urine Ketones     (Negative)  


 


Urine Blood     (Negative)  


 


Urine Nitrate     (Negative)  


 


Urine Bilirubin     (Negative)  


 


Urine Urobilinogen     (Negative)  


 


Ur Leukocyte Esterase     (Negative)  


 


Urine Glucose     (Negative)  














Assess/Plan/Problems-Billing


Assessment: 





This is a very confused 85 year old female with history of dementia with more 

severe features at home and since admission. 





- Patient Problems


(1) Altered mental status


Comment: 


 - Baseline dementia, less agitation today, improving but continues to be 

confused. Suspect worsening dementia. 


 - Labs unremarkable


 - CT head negative for any acute pathology


 - MRI with no changes


 - Remeron increased, seroquel PRN, would hold seroquel for sedation


 - Dr. Antonio following


 - Behavior appears to be more sarah today   





(2) Hypothyroid


Comment: 


 - TSH 0.03, continue to hold synthroid


 - overmedication with synthroid may have been contributing factor


   





(3) Full code status





Status and Disposition: 





Remain inpatient for symptom management, has bed in [facility] for 

monday.

## 2018-03-04 RX ADMIN — CITALOPRAM HYDROBROMIDE SCH MG: 10 TABLET ORAL at 10:37

## 2018-03-04 RX ADMIN — MIRTAZAPINE SCH MG: 15 TABLET, FILM COATED ORAL at 21:12

## 2018-03-04 RX ADMIN — CYANOCOBALAMIN TAB 500 MCG SCH MCG: 500 TAB at 10:37

## 2018-03-04 RX ADMIN — HEPARIN SODIUM SCH UNITS: 5000 INJECTION INTRAVENOUS; SUBCUTANEOUS at 06:11

## 2018-03-04 RX ADMIN — HEPARIN SODIUM SCH UNITS: 5000 INJECTION INTRAVENOUS; SUBCUTANEOUS at 21:12

## 2018-03-04 RX ADMIN — HEPARIN SODIUM SCH: 5000 INJECTION INTRAVENOUS; SUBCUTANEOUS at 15:59

## 2018-03-04 NOTE — PN
Subjective


Date of Service: 18


Interval History: 








Pt sitting up in bed in [facility]. Alert to self. Very confused at baseline. Per son 

she is at her baseline. Pt offers no complaints. Per nursing staff no concerns. 





Spoke to the son at length today who agrees with the plan of care. 








Objective


Active Medications: 








Acetaminophen (Tylenol Tab*)  650 mg PO Q4H PRN


   PRN Reason: FEVER/PAIN


Citalopram [facility] ([facility]a Tab*)  [facility]


   PRN Reason: Protocol


   Last Admin: 18 10:37 Dose:  10 mg


Cyanocobalamin (Vitamin B12 Tab*)  500 mcg PO QAM [facility]


   Last Admin: 18 10:37 Dose:  500 mcg


Heparin Sodium (Porcine) (Heparin Vial(*))  5,000 units SUBCUT Q8HR [facility]


   Last Admin: 18 06:11 Dose:  5,000 units


Sodium Chloride (Ns 0.9% 1000 Ml*)  1,000 mls @ 75 mls/hr IV PER RATE [facility]


   Last Admin: 18 12:33 Dose:  75 mls/hr


Mirtazapine (Remeron Tab*)  30 mg PO BEDTIME [facility]


   Last Admin: 18 19:40 Dose:  30 mg


Quetiapine Fumarate (Seroquel Tab*)  25 mg PO BEDTIME PRN


   PRN Reason: AGITATION








 Vital Signs - 8 hr











  18





  03:24 07:48 08:00


 


Temperature 98.1 F 98.1 F 


 


Pulse Rate 91 81 


 


Respiratory 18 18 16





Rate   


 


Blood Pressure 124/42 132/60 





(mmHg)   


 


O2 Sat by Pulse 91 94 





Oximetry   











Oxygen Devices in Use Now: None


Appearance: elderly female sitting up in bed alert, very pleasent in NAD


Eyes: No Scleral Icterus, PERRLA


Ears/Nose/Mouth/Throat: NL Teeth, Lips, Gums, Mucous Membranes Moist


Neck: NL Appearance and Movements; NL JVP


Respiratory: Symmetrical Chest Expansion and Respiratory Effort, Clear to 

Auscultation


Cardiovascular: NL Sounds; No Murmurs; No JVD, RRR, No Edema


Abdominal: NL Sounds; No Tenderness; No Distention


Extremities: No Edema, No Clubbing, Cyanosis


Skin: No Rash or Ulcers, No Nodules or Sclerosis


Neurological: Alert and Oriented x 3, NL Sensation, NL Muscle Strength and Tone


Lines/Tubes/Other Access: Clean, Dry and Intact Peripheral IV


Nutrition: Taking PO's


Result Diagrams: 


 18 05:51





 18 09:20


Additional Lab and Data: 


 Lab Results











  18 Range/Units





  15:33 15:59 15:59 


 


WBC   8.9   (3.5-10.8)  10^3/ul


 


RBC   3.33 L   (4.0-5.4)  10^6/ul


 


Hgb   10.8 L   (12.0-16.0)  g/dl


 


Hct   32 L   (35-47)  %


 


MCV   96   (80-97)  fL


 


MCH   32 H   (27-31)  pg


 


MCHC   34   (31-36)  g/dl


 


RDW   16 H   (10.5-15)  %


 


Plt Count   163   (150-450)  10^3/ul


 


MPV   8   (7.4-10.4)  um3


 


Neut % (Auto)   56.1   (38-83)  %


 


Lymph % (Auto)   35.0   (25-47)  %


 


Mono % (Auto)   7.1 H   (0-7)  %


 


Eos % (Auto)   1.3   (0-6)  %


 


Baso % (Auto)   0.5   (0-2)  %


 


Absolute Neuts (auto)   5.0   (1.5-7.7)  10^3/ul


 


Absolute Lymphs (auto)   3.1   (1.0-4.8)  10^3/ul


 


Absolute Monos (auto)   0.6   (0-0.8)  10^3/ul


 


Absolute Eos (auto)   0.1   (0-0.6)  10^3/ul


 


Absolute Basos (auto)   0   (0-0.2)  10^3/ul


 


Absolute Nucleated RBC   0   10^3/ul


 


Nucleated RBC %   0.1   


 


Sodium    141  (133-145)  mmol/L


 


Potassium    4.3  (3.5-5.0)  mmol/L


 


Chloride    109  (101-111)  mmol/L


 


Carbon Dioxide    28  (22-32)  mmol/L


 


Anion Gap    4  (2-11)  mmol/L


 


BUN    17  (6-24)  mg/dL


 


Creatinine    0.90  (0.51-0.95)  mg/dL


 


Est GFR ( Amer)    76.5  (>60)  


 


Est GFR (Non-Af Amer)    59.5  (>60)  


 


BUN/Creatinine Ratio    18.9  (8-20)  


 


Glucose    115 H  ()  mg/dL


 


Lactic Acid     (0.5-2.0)  mmol/L


 


Calcium    9.2  (8.6-10.3)  mg/dL


 


Total Bilirubin    0.30  (0.2-1.0)  mg/dL


 


AST    15  (13-39)  U/L


 


ALT    5 L  (7-52)  U/L


 


Alkaline Phosphatase    43  ()  U/L


 


Ammonia     (16-53)  mol/L


 


Troponin I    0.00  (<0.04)  ng/mL


 


Total Protein    6.6  (6.4-8.9)  g/dL


 


Albumin    3.4  (3.2-5.2)  g/dL


 


Globulin    3.2  (2-4)  g/dL


 


Albumin/Globulin Ratio    1.1  (1-3)  


 


Vitamin B12    457  (180-914)  pg/mL


 


TSH    0.03 L  (0.34-5.60)  mcIU/mL


 


Free T4    1.41 H  (0.61-1.12)  ng/dL


 


Thyroxine (T4)    9.68  (6.09-12.23)  mcg/mL


 


Free T3    2.80  (2.5-3.9)  pg/mL


 


Total T3    0.71 L  (0.87-1.78)  ng/mL


 


Urine Color  Yellow    


 


Urine Appearance  Cloudy    


 


Urine pH  7.0    (5-9)  


 


Ur Specific Gravity  1.006 L    (1.010-1.030)  


 


Urine Protein  Negative    (Negative)  


 


Urine Ketones  Negative    (Negative)  


 


Urine Blood  Negative    (Negative)  


 


Urine Nitrate  Negative    (Negative)  


 


Urine Bilirubin  Negative    (Negative)  


 


Urine Urobilinogen  Negative    (Negative)  


 


Ur Leukocyte Esterase  Negative    (Negative)  


 


Urine Glucose  Negative    (Negative)  














  18 Range/Units





  15:59 19:37 19:37 


 


WBC    8.6  (3.5-10.8)  10^3/ul


 


RBC    3.34 L  (4.0-5.4)  10^6/ul


 


Hgb    10.7 L  (12.0-16.0)  g/dl


 


Hct    32 L  (35-47)  %


 


MCV    96  (80-97)  fL


 


MCH    32 H  (27-31)  pg


 


MCHC    33  (31-36)  g/dl


 


RDW    15  (10.5-15)  %


 


Plt Count    169  (150-450)  10^3/ul


 


MPV    8  (7.4-10.4)  um3


 


Neut % (Auto)    45.7  (38-83)  %


 


Lymph % (Auto)    46.3  (25-47)  %


 


Mono % (Auto)    6.1  (0-7)  %


 


Eos % (Auto)    1.2  (0-6)  %


 


Baso % (Auto)    0.7  (0-2)  %


 


Absolute Neuts (auto)    3.9  (1.5-7.7)  10^3/ul


 


Absolute Lymphs (auto)    4.0  (1.0-4.8)  10^3/ul


 


Absolute Monos (auto)    0.5  (0-0.8)  10^3/ul


 


Absolute Eos (auto)    0.1  (0-0.6)  10^3/ul


 


Absolute Basos (auto)    0.1  (0-0.2)  10^3/ul


 


Absolute Nucleated RBC    0  10^3/ul


 


Nucleated RBC %    0  


 


Sodium     (133-145)  mmol/L


 


Potassium     (3.5-5.0)  mmol/L


 


Chloride     (101-111)  mmol/L


 


Carbon Dioxide     (22-32)  mmol/L


 


Anion Gap     (2-11)  mmol/L


 


BUN   17   (6-24)  mg/dL


 


Creatinine     (0.51-0.95)  mg/dL


 


Est GFR ( Amer)     (>60)  


 


Est GFR (Non-Af Amer)     (>60)  


 


BUN/Creatinine Ratio     (8-20)  


 


Glucose     ()  mg/dL


 


Lactic Acid  1.7    (0.5-2.0)  mmol/L


 


Calcium     (8.6-10.3)  mg/dL


 


Total Bilirubin     (0.2-1.0)  mg/dL


 


AST     (13-39)  U/L


 


ALT     (7-52)  U/L


 


Alkaline Phosphatase     ()  U/L


 


Ammonia     (16-53)  mol/L


 


Troponin I     (<0.04)  ng/mL


 


Total Protein     (6.4-8.9)  g/dL


 


Albumin     (3.2-5.2)  g/dL


 


Globulin     (2-4)  g/dL


 


Albumin/Globulin Ratio     (1-3)  


 


Vitamin B12     (180-914)  pg/mL


 


TSH     (0.34-5.60)  mcIU/mL


 


Free T4     (0.61-1.12)  ng/dL


 


Thyroxine (T4)     (6.09-12.23)  mcg/mL


 


Free T3     (2.5-3.9)  pg/mL


 


Total T3     (0.87-1.78)  ng/mL


 


Urine Color     


 


Urine Appearance     


 


Urine pH     (5-9)  


 


Ur Specific Gravity     (1.010-1.030)  


 


Urine Protein     (Negative)  


 


Urine Ketones     (Negative)  


 


Urine Blood     (Negative)  


 


Urine Nitrate     (Negative)  


 


Urine Bilirubin     (Negative)  


 


Urine Urobilinogen     (Negative)  


 


Ur Leukocyte Esterase     (Negative)  


 


Urine Glucose     (Negative)  











Diagnostic Imaging: 





MRI BRAIN





Patient Name:         HARMONY MORELOS                                             

                        Medical Record#: [id number]


Ordering Physician: Elaine Antonio MD                                             

                        Acct.#: [id number]


:     1932         Age: 85   Sex: F                                   

                        Location: [address] [place] [address] MEDICAL


Exam Date: 18 1601                                                       

                        ADM Status: ADM Joann


Order Information:                         MRI BRAIN W/O


Accession Number:                          [id number]


CPT:                                       71282


Indication: Dementia. Mental status change.





Comparison: CT brain of one day prior.





Technique: XSI Semi Conductors Optima 1.5 Jesica DA820L with GEM suite.  MRI brain without 

contrast. 





Report: Diffusion series is negative for acute or subacute ischemia. 

Susceptibility series


is negative for stigmata of hemosiderin deposition to indicate previous 

hemorrhage.


Moderate prominence of the cerebral sulci reflecting volume loss. Unremarkable 

ventricles


and basal cisterns.





Increased T2 signal in the periventricular and subcortical white matter without 

mass


effect most consistent with chronic small vessel ischemic disease. No intra or 

extra-axial


fluid collection evident. Unremarkable orbital contents.





Preserved major intracranial flow-voids.





Indolent thickening of the inner table of the frontal bone. No suspicious 

calvarial or


skull base fractures evident. Clear paranasal sinuses and mastoid air spaces. 

Unremarkable


scalp.





IMPRESSION: Involutional change and stigmata of chronic small vessel ischemic 

disease. No


evidence for acute or subacute ischemia or other acute intracranial process.





____________________________________________________________


<Electronically signed by Sd Chirinos MD in OV>  18


Dictated By: Sd Chirinos MD


Dictated Date/Time: 18


Transcribed Date/Time: 18


Copy to:











HEAD CT





Patient Name:         HARMONY MORELOS                                             

                        Medical Record#: [id number]


Ordering Physician: Kali Michelle MD                                           

                        Acct.#: [id number]


:     1932         Age: 85   Sex: F                                   

                        Location: EMERGENCY DEPARTMENT


Exam Date: 18                                                       

                        ADM Status: REG ER


Order Information:                         CT BRAIN WO


Accession Number:                          [id number]


CPT:                                       [id number]


Indication: Altered mental status.





Comparison: No prior exams available on the [facility] [place] for comparison.





Technique: Noncontrast CT vertex of skull through foramen magnum.





Report: Moderately severe prominence of the cerebral sulci and moderate 

prominence of the


cerebellar fissures reflecting atrophy. Unremarkable ventricles and basal 

cisterns.


Decreased density in the periventricular and subcortical white matter while non-

specific


is most likely due to chronic microangiopathy. Negative for gray matter white 

matter


obscuration, intra or extra-axial hemorrhage, or mass effect. Unremarkable 

partially


visualized orbital contents.





No fracture or suspicious calvarial or skull base lesion evident. Indolent 

thickening of


the inner table of the frontal bone noted. Clear visualized paranasal sinuses 

and mastoid


air spaces. Unremarkable scalp.





IMPRESSION: 


1. No acute intracranial process evident.


2. Involutional change and stigmata of chronic small vessel ischemic disease.





____________________________________________________________


<Electronically signed by Sd Chirinos MD in OV>  18


Dictated By: Sd Chirinos MD


Dictated Date/Time: 18


Transcribed Date/Time: 18


Copy to:











Assess/Plan/Problems-Billing


Assessment: 





This is a very confused 85 year old female with history of dementia with more 

severe features at home and since admission. 





- Patient Problems


(1) Altered mental status


Comment: 


 - Baseline dementia. Suspect worsening dementia. 


 - Labs unremarkable


 - CT head negative for any acute pathology


 - MRI with no changes


 - Remeron increased, seroquel PRN, would hold seroquel for sedation


 - Appreciate neuro consult


 - Behavior appears to be more stable today   





(2) Hypothyroid


Comment: 


 - TSH 0.03, continue to hold synthroid


 - overmedication with synthroid may have been contributing factor


   





(3) B12 deficiency


Comment: 


continue B12 supplement   





(4) Full code status





Status and Disposition: 





Remain inpatient for symptom management, has bed in [facility] assisted living for 

Monday.

## 2018-03-05 RX ADMIN — HEPARIN SODIUM SCH: 5000 INJECTION INTRAVENOUS; SUBCUTANEOUS at 06:14

## 2018-03-05 RX ADMIN — CYANOCOBALAMIN TAB 500 MCG SCH MCG: 500 TAB at 10:03

## 2018-03-05 RX ADMIN — CITALOPRAM HYDROBROMIDE SCH MG: 10 TABLET ORAL at 10:03

## 2018-03-05 NOTE — DS
DISCHARGE SUMMARY:

 

DATE OF ADMISSION:  02/26/18

 

DATE OF DISCHARGE:  03/05/18

 

PROVIDER:  Ana Traylor NP

 

ATTENDING PHYSICIAN:  Dr. Cyr* (report dictated by Ana Tralyor NP).

 

PRIMARY CARE PROVIDER:  Per son currently working on getting his mother a new 
primary care provider in the [place] area.

 

Discharged to [facility] in [place], [place].

 

PRIMARY DIAGNOSES:

1.  Delirium, suspect secondary to worsening dementia.

2.  Hyperthyroid.  The patient has a history of hypothyroidism, but was noted 
to have a low TSH of 0.03 on admission and her Synthroid was held.

 

SECONDARY DIAGNOSES:

1.  B12 deficiency.

2.  History of breast cancer.

3.  Depression.

 

DISCHARGE MEDICATIONS:

1.  Vitamin B12 500 mcg p.o. q.a.m.

2.  Vitamin D 2000 units p.o. daily.

3.  Seroquel 25 mg p.o. at bedtime p.r.n. (new medication).

4.  Mirtazapine 30 mg p.o. at bedtime (dose increased from 15 mg).

5.  _____ mg p.o. q.4 hours p.r.n.

6.  Celexa 10 mg p.o. q.a.m.

 

Medications discontinued:

 

1.  Exelon patch.

2.  Lexapro 5 mg p.o. daily.

 

HISTORY OF PRESENT ILLNESS AND HOSPITAL COURSE:  Please see history and 
physical by Donald Leong NP, for full admission details, but in summary, this 
is an 85-year- old female with a past medical history of dementia, 
hypothyroidism and breast cancer, who presented to the emergency department on 
02/26/18 with altered mental status.  The patient was living with her son, 
Linda, who reported she had been having hallucinations and was very paranoid 
and not acting at her baseline and she was brought to the emergency department 
for further evaluation.  She was also noted to have aggressive behaviors 
particularly at nighttime.  In the emergency department, she was found to have 
a suppressed TSH of 0.03 and her Synthroid was discontinued.  Her labs had been 
fairly unremarkable with a noted negative urinalysis.  The patient underwent an 
MRI of her brain, which showed no acute pathology in which the read states 
"involutional change and stigmata of chronic small vessel ischemic disease.  No 
evidence for acute or subacute ischemia or other acute intracranial process."  
She was seen by neurologist, Dr. Antonio, whose impression was that it is 
possible in this relative state of hyperthyroidism, it could contribute to 
delirium on top of her dementia, and suspected this was delirium.  Due to some 
fluent aphasia, she did order the MRI, which was a negative read as stated 
above.

 

In discussion with her son, Linda, he confirms that his mother is back to her 
baseline.  She is very confused at baseline, but pleasantly confused over the 
past several days with aggressive behaviors and hallucinations have resolved 
with increasing her Remeron to 30 mg p.o. at bedtime and adding Seroquel 25 mg 
p.o. at bedtime p.r.n.

 

There has been no identified infectious process.  Suspect this is secondary to 
delirium with advanced dementia.  The plan will be to transfer the patient to 
an assisted living facility at discharge.

 

The son is currently setting his mother up with a new primary care provider in 
the [place] area.  He and his wife are very involved in her care and have 
continued to be a good support for the patient.  However, it is no longer 
feasible to have her living in their home due to her advanced dementia, she 
requires an increased level of care.

 

DISCHARGE PLAN:

1.  Discharged to [facility] in [place], [place], tomorrow, Monday
, 03/05/18.

2.  I discussed with the son to follow up with primary care provider within 7 
to 10 days. 3.  Follow up TSH in 1 week.

4.  Stable for discharge tomorrow.

 

TIME SPENT:  Approximately 60 minutes was spent on this discharge.

 

____________________________________ 

ANA TRAYLOR, BENI

 

[id number]/CPS #: [id number]

SOREN